# Patient Record
Sex: FEMALE | Race: WHITE | NOT HISPANIC OR LATINO | URBAN - METROPOLITAN AREA
[De-identification: names, ages, dates, MRNs, and addresses within clinical notes are randomized per-mention and may not be internally consistent; named-entity substitution may affect disease eponyms.]

---

## 2023-01-04 ENCOUNTER — INPATIENT (INPATIENT)
Facility: HOSPITAL | Age: 73
LOS: 12 days | Discharge: NURSING, REHAB OR LONG-TERM CARE | End: 2023-01-17
Attending: INTERNAL MEDICINE | Admitting: INTERNAL MEDICINE
Payer: MEDICARE

## 2023-01-04 VITALS
HEIGHT: 60 IN | HEART RATE: 84 BPM | OXYGEN SATURATION: 100 % | SYSTOLIC BLOOD PRESSURE: 115 MMHG | RESPIRATION RATE: 18 BRPM | WEIGHT: 149.03 LBS | DIASTOLIC BLOOD PRESSURE: 71 MMHG | TEMPERATURE: 97 F

## 2023-01-04 DIAGNOSIS — Z96.642 PRESENCE OF LEFT ARTIFICIAL HIP JOINT: Chronic | ICD-10-CM

## 2023-01-04 LAB
ALBUMIN SERPL ELPH-MCNC: 1.9 G/DL — LOW (ref 3.3–5)
ALP SERPL-CCNC: 146 U/L — HIGH (ref 40–120)
ALT FLD-CCNC: 12 U/L — SIGNIFICANT CHANGE UP (ref 12–78)
ANION GAP SERPL CALC-SCNC: 11 MMOL/L — SIGNIFICANT CHANGE UP (ref 5–17)
ANION GAP SERPL CALC-SCNC: 8 MMOL/L — SIGNIFICANT CHANGE UP (ref 5–17)
APTT BLD: 47.5 SEC — HIGH (ref 27.5–35.5)
AST SERPL-CCNC: 12 U/L — LOW (ref 15–37)
BASOPHILS # BLD AUTO: 0.07 K/UL — SIGNIFICANT CHANGE UP (ref 0–0.2)
BASOPHILS NFR BLD AUTO: 0.5 % — SIGNIFICANT CHANGE UP (ref 0–2)
BILIRUB SERPL-MCNC: 0.3 MG/DL — SIGNIFICANT CHANGE UP (ref 0.2–1.2)
BUN SERPL-MCNC: 55 MG/DL — HIGH (ref 7–23)
BUN SERPL-MCNC: 55 MG/DL — HIGH (ref 7–23)
CALCIUM SERPL-MCNC: 10.5 MG/DL — HIGH (ref 8.5–10.1)
CALCIUM SERPL-MCNC: 10.6 MG/DL — HIGH (ref 8.5–10.1)
CHLORIDE SERPL-SCNC: 106 MMOL/L — SIGNIFICANT CHANGE UP (ref 96–108)
CHLORIDE SERPL-SCNC: 107 MMOL/L — SIGNIFICANT CHANGE UP (ref 96–108)
CO2 SERPL-SCNC: 16 MMOL/L — LOW (ref 22–31)
CO2 SERPL-SCNC: 17 MMOL/L — LOW (ref 22–31)
CREAT SERPL-MCNC: 1.12 MG/DL — SIGNIFICANT CHANGE UP (ref 0.5–1.3)
CREAT SERPL-MCNC: 1.2 MG/DL — SIGNIFICANT CHANGE UP (ref 0.5–1.3)
EGFR: 48 ML/MIN/1.73M2 — LOW
EGFR: 52 ML/MIN/1.73M2 — LOW
EOSINOPHIL # BLD AUTO: 0.35 K/UL — SIGNIFICANT CHANGE UP (ref 0–0.5)
EOSINOPHIL NFR BLD AUTO: 2.5 % — SIGNIFICANT CHANGE UP (ref 0–6)
FLUAV AG NPH QL: SIGNIFICANT CHANGE UP
FLUBV AG NPH QL: SIGNIFICANT CHANGE UP
GLUCOSE SERPL-MCNC: 309 MG/DL — HIGH (ref 70–99)
GLUCOSE SERPL-MCNC: 320 MG/DL — HIGH (ref 70–99)
HCT VFR BLD CALC: 30.8 % — LOW (ref 34.5–45)
HGB BLD-MCNC: 9.7 G/DL — LOW (ref 11.5–15.5)
IMM GRANULOCYTES NFR BLD AUTO: 1.3 % — HIGH (ref 0–0.9)
INR BLD: 1.25 RATIO — HIGH (ref 0.88–1.16)
LACTATE SERPL-SCNC: 2 MMOL/L — SIGNIFICANT CHANGE UP (ref 0.7–2)
LYMPHOCYTES # BLD AUTO: 1.66 K/UL — SIGNIFICANT CHANGE UP (ref 1–3.3)
LYMPHOCYTES # BLD AUTO: 11.9 % — LOW (ref 13–44)
MAGNESIUM SERPL-MCNC: 1.7 MG/DL — SIGNIFICANT CHANGE UP (ref 1.6–2.6)
MCHC RBC-ENTMCNC: 26 PG — LOW (ref 27–34)
MCHC RBC-ENTMCNC: 31.5 G/DL — LOW (ref 32–36)
MCV RBC AUTO: 82.6 FL — SIGNIFICANT CHANGE UP (ref 80–100)
MONOCYTES # BLD AUTO: 1.55 K/UL — HIGH (ref 0–0.9)
MONOCYTES NFR BLD AUTO: 11.1 % — SIGNIFICANT CHANGE UP (ref 2–14)
NEUTROPHILS # BLD AUTO: 10.1 K/UL — HIGH (ref 1.8–7.4)
NEUTROPHILS NFR BLD AUTO: 72.7 % — SIGNIFICANT CHANGE UP (ref 43–77)
NRBC # BLD: 0 /100 WBCS — SIGNIFICANT CHANGE UP (ref 0–0)
PLATELET # BLD AUTO: 419 K/UL — HIGH (ref 150–400)
POTASSIUM SERPL-MCNC: 5.2 MMOL/L — SIGNIFICANT CHANGE UP (ref 3.5–5.3)
POTASSIUM SERPL-MCNC: 5.4 MMOL/L — HIGH (ref 3.5–5.3)
POTASSIUM SERPL-SCNC: 5.2 MMOL/L — SIGNIFICANT CHANGE UP (ref 3.5–5.3)
POTASSIUM SERPL-SCNC: 5.4 MMOL/L — HIGH (ref 3.5–5.3)
PROT SERPL-MCNC: 6 GM/DL — SIGNIFICANT CHANGE UP (ref 6–8.3)
PROTHROM AB SERPL-ACNC: 14.9 SEC — HIGH (ref 10.5–13.4)
RBC # BLD: 3.73 M/UL — LOW (ref 3.8–5.2)
RBC # FLD: 15.2 % — HIGH (ref 10.3–14.5)
SARS-COV-2 RNA SPEC QL NAA+PROBE: SIGNIFICANT CHANGE UP
SODIUM SERPL-SCNC: 131 MMOL/L — LOW (ref 135–145)
SODIUM SERPL-SCNC: 134 MMOL/L — LOW (ref 135–145)
TSH SERPL-MCNC: 4.01 UIU/ML — HIGH (ref 0.36–3.74)
WBC # BLD: 13.91 K/UL — HIGH (ref 3.8–10.5)
WBC # FLD AUTO: 13.91 K/UL — HIGH (ref 3.8–10.5)

## 2023-01-04 PROCEDURE — 71045 X-RAY EXAM CHEST 1 VIEW: CPT | Mod: 26

## 2023-01-04 PROCEDURE — 70450 CT HEAD/BRAIN W/O DYE: CPT | Mod: 26,MA

## 2023-01-04 PROCEDURE — 99291 CRITICAL CARE FIRST HOUR: CPT

## 2023-01-04 PROCEDURE — 99222 1ST HOSP IP/OBS MODERATE 55: CPT

## 2023-01-04 PROCEDURE — 93010 ELECTROCARDIOGRAM REPORT: CPT

## 2023-01-04 RX ORDER — SODIUM CHLORIDE 9 MG/ML
1000 INJECTION INTRAMUSCULAR; INTRAVENOUS; SUBCUTANEOUS ONCE
Refills: 0 | Status: COMPLETED | OUTPATIENT
Start: 2023-01-04 | End: 2023-01-05

## 2023-01-04 RX ORDER — SODIUM CHLORIDE 9 MG/ML
1000 INJECTION, SOLUTION INTRAVENOUS ONCE
Refills: 0 | Status: COMPLETED | OUTPATIENT
Start: 2023-01-04 | End: 2023-01-04

## 2023-01-04 RX ADMIN — SODIUM CHLORIDE 1000 MILLILITER(S): 9 INJECTION, SOLUTION INTRAVENOUS at 18:12

## 2023-01-04 NOTE — ED ADULT NURSE REASSESSMENT NOTE - NS ED NURSE REASSESS COMMENT FT1
Patient noted incontinence dermatitis. Moisturizer applied all over the skin on areas of dermatitis. patient noted with left gluteal stage 3 with yellow slough. patient noted with right foot stage I red and blanchable pressure ulcer. patient has unstageable right middle back area. patient cleaned and pressure foam dressing applied.

## 2023-01-04 NOTE — H&P ADULT - PROBLEM SELECTOR PLAN 4
On metformin only, check A1c, LDISS Patient unclear about dosing, restart BP meds as needed Unclear baseline, may be 2/2 CKD  Check iron studies, FOBT

## 2023-01-04 NOTE — H&P ADULT - PROBLEM SELECTOR PLAN 1
Found with feces, cockroaches, has been unable to get up from her couch x months.   Was trying to care for herself with help of neighbor.   - SW consult to f/up if open APS case  - Wound care consult for rashes + likely pressure ulcer on back  - Cousin Munira left 3rd number to contact in case of emergency, Eleazar [phone]  - Porter tran Found with feces, cockroaches, has been unable to get up from her couch x months.   Was trying to care for herself with help of neighbor.   - SW consult to f/up if open APS case  - Wound care consult for rashes + likely pressure ulcer on back  - Cousin Munira left 3rd number to contact in case of emergency,  [place]azar [phone] along with  or . Unclear if patient able to make DNR decision today, will need Munira to fax over HCP and DNR forms tomorrow morning  - Porter tran number [phone]

## 2023-01-04 NOTE — H&P ADULT - HISTORY OF PRESENT ILLNESS
72 F with hx of HTN, non-IDMMT2, presents from home for inability to care for herself via Adult Protective Services.     Has not been able to get off her couch for 6 months, states started to have pain in L hip and since then hasn't been able to get up. Her 77 old neighbor Porter (Boy Li) does iADLs for patient such as providing food for patient. Porter noticed 2 days ago patient was confused so called 911. For unclear reason did not go to the hospital. Porter then was told to call [facility] who evaluated patient today and sent patient to the hospital.     Patient denies fevers, chills, CP, SOB, abdominal pain. Has had her abdominal and back rash for "a while". Patient denies hx of emotional/physical abuse from her neighbor Porter.     Her closest relative cousin Munira is her HCP and calls weekly however has not seen patient since 11/2021. Per Munira and patient a DNR order has been signed.

## 2023-01-04 NOTE — ED PROVIDER NOTE - CLINICAL SUMMARY MEDICAL DECISION MAKING FREE TEXT BOX
failure to thrive. reassess. failure to thrive. admit. failure to thrive. admit.  power of  and health care proxy is Munira Ling at  or  failure to thrive. admit.  power of  and health care proxy is Munira Ling at  or   I read ekg as nsr rate 84, no st elevation or depression, qtc 444, narrow qrs, normal axis.

## 2023-01-04 NOTE — H&P ADULT - NSHPPHYSICALEXAM_GEN_ALL_CORE
T(C): 36.7 (01-04-23 @ 22:30), Max: 36.7 (01-04-23 @ 22:30)  HR: 87 (01-04-23 @ 22:30) (84 - 87)  BP: 109/70 (01-04-23 @ 22:30) (109/70 - 115/71)  RR: 18 (01-04-23 @ 22:30) (18 - 18)  SpO2: 98% (01-04-23 @ 22:30) (98% - 100%)    CONSTITUTIONAL: NAD  EYES:  No conjunctival or scleral injection  HENT:Atraumatic, no external nasal lesions, dry mucous membranes  NECK: No cervical lymphadenopathy appreciated  RESPIRATORY: No respiratory distress, CTA b/l, no wheezes, rales or rhonchi  CARDIOVASCULAR: RRR, +S1S2, no murmurs, no peripheral edema  GASTROINTESTINAL: Soft, non tender, non distended, no rebound, no guarding; No palpable masses palpated  SKIN: Large weeping erythematous patches over L abdomen/groin and L back. Cockroaches in bedpan next to bed. ++ Seborrheic dermatitis diffusely  NEUROLOGIC: Decreased strength/ROM of L hip  PSYCHIATRIC: A+O x 3  EXTREMITIES:  No edema

## 2023-01-04 NOTE — ED PROVIDER NOTE - OBJECTIVE STATEMENT
Call from Adult Protective Services that says that patient is bed bound and incontinence of stool and urine. Not taking of self and cannot. Needs placement. Maite Poole

## 2023-01-04 NOTE — H&P ADULT - ASSESSMENT
72 F with hx of HTN, non-IDMMT2, presents from home for inability to care for herself via Adult Protective Services.

## 2023-01-04 NOTE — ED ADULT NURSE NOTE - NSIMPLEMENTINTERV_GEN_ALL_ED
Implemented All Fall Risk Interventions:  Abie to call system. Call bell, personal items and telephone within reach. Instruct patient to call for assistance. Room bathroom lighting operational. Non-slip footwear when patient is off stretcher. Physically safe environment: no spills, clutter or unnecessary equipment. Stretcher in lowest position, wheels locked, appropriate side rails in place. Provide visual cue, wrist band, yellow gown, etc. Monitor gait and stability. Monitor for mental status changes and reorient to person, place, and time. Review medications for side effects contributing to fall risk. Reinforce activity limits and safety measures with patient and family.

## 2023-01-04 NOTE — ED PROVIDER NOTE - PHYSICAL EXAMINATION
gen - patient is unkempt and disheveled   skin - large territory pressure injury on back  cv - rrr  resp - ctab  neuro - left facial droop, left leg 3/5, other extremities 5/5. sensory intact. following commands. oriented x3  msk - slightly cachectic extremities   eyes - eomi, perrl  id - afebrile gen - patient is unkempt and disheveled   skin - large territory pressure injury on back, also numerous areas of dermatitis on skin   cv - rrr  resp - ctab  neuro - left facial droop, left leg 3/5, other extremities 5/5. sensory intact. following commands. oriented x3  msk - slightly cachectic extremities   eyes - eomi, perrl  id - afebrile

## 2023-01-04 NOTE — ED ADULT TRIAGE NOTE - CHIEF COMPLAINT QUOTE
octavio from home pt's friend/neighbor called 911 due to pt's environment pt unable to care for herself non ambulatory at present urine/feces all over pt's residence and person per ems pt alert and oriented and denies any c/o at present pt visually impaired

## 2023-01-04 NOTE — ED ADULT NURSE NOTE - ED STAT RN HANDOFF DETAILS
Report given to Sunni Bowers RN. Patient in no acute or respiratory distress. patient in stretcher. latest vital signs recorded in flowsheet. endorsed all concerns to RN.

## 2023-01-04 NOTE — H&P ADULT - PROBLEM SELECTOR PLAN 6
On metformin only, check A1c, LDISS    Has elevated TSH to 4, check T4 however within goal for elderly

## 2023-01-04 NOTE — ED ADULT NURSE NOTE - OBJECTIVE STATEMENT
Patient A&Ox3. Patient BIBEMS. as per triage, patient visually impaired. Patient non-ambulatory covered with urine/feces. patient sent to ED because patient can't take of herself. Patient bedbound. patient denies any pain at this time. patient noted with large Stage I ulcer from trunk area to sacral area. patient reports having stroke, with left sided deficit, patient noted with LE and upper extremity left side weakness, residual left side droop. patient able to follow commands. denies any other symptoms at this time.

## 2023-01-04 NOTE — H&P ADULT - PROBLEM SELECTOR PLAN 5
On metformin only, check A1c, LDISS    Has elevated TSH to 4, check T4 however within goal for elderly Patient unclear about dosing, restart BP meds as needed

## 2023-01-04 NOTE — H&P ADULT - PROBLEM SELECTOR PLAN 3
Patient unclear about dosing, restart BP meds as needed May have CKD vs ROGE  Trend in AM after fluids

## 2023-01-04 NOTE — H&P ADULT - PROBLEM SELECTOR PLAN 2
On CT. May be cause of L lower extremity weakness.   Start aspirin, atorvastatin, monitor on tele and check echo  Check L hip x-ray given hx of hip replacement On CT. May be cause of L lower extremity weakness.   Start aspirin, atorvastatin, monitor on tele and check echo  Check L hip x-ray given hx of hip replacement  - PT/OT

## 2023-01-04 NOTE — H&P ADULT - NSHPLABSRESULTS_GEN_ALL_CORE
LABS:                          9.7    13.91 )-----------( 419      ( 04 Jan 2023 17:15 )             30.8     01-04    134<L>  |  107  |  55<H>  ----------------------------<  309<H>  5.2   |  16<L>  |  1.12    Ca    10.6<H>      04 Jan 2023 21:15  Mg     1.7     01-04    TPro  6.0  /  Alb  1.9<L>  /  TBili  0.3  /  DBili  x   /  AST  12<L>  /  ALT  12  /  AlkPhos  146<H>  01-04    PT/INR - ( 04 Jan 2023 17:15 )   PT: 14.9 sec;   INR: 1.25 ratio         PTT - ( 04 Jan 2023 17:15 )  PTT:47.5 sec    Albumin, Serum: 1.9 g/dL (01-04-23 @ 17:15)

## 2023-01-05 DIAGNOSIS — I10 ESSENTIAL (PRIMARY) HYPERTENSION: ICD-10-CM

## 2023-01-05 DIAGNOSIS — R79.89 OTHER SPECIFIED ABNORMAL FINDINGS OF BLOOD CHEMISTRY: ICD-10-CM

## 2023-01-05 DIAGNOSIS — E11.9 TYPE 2 DIABETES MELLITUS WITHOUT COMPLICATIONS: ICD-10-CM

## 2023-01-05 DIAGNOSIS — I63.9 CEREBRAL INFARCTION, UNSPECIFIED: ICD-10-CM

## 2023-01-05 DIAGNOSIS — D64.9 ANEMIA, UNSPECIFIED: ICD-10-CM

## 2023-01-05 DIAGNOSIS — R46.89 OTHER SYMPTOMS AND SIGNS INVOLVING APPEARANCE AND BEHAVIOR: ICD-10-CM

## 2023-01-05 LAB
A1C WITH ESTIMATED AVERAGE GLUCOSE RESULT: 7.7 % — HIGH (ref 4–5.6)
ALBUMIN SERPL ELPH-MCNC: 1.9 G/DL — LOW (ref 3.3–5)
ALP SERPL-CCNC: 146 U/L — HIGH (ref 40–120)
ALT FLD-CCNC: 14 U/L — SIGNIFICANT CHANGE UP (ref 12–78)
AMPHET UR-MCNC: NEGATIVE — SIGNIFICANT CHANGE UP
ANION GAP SERPL CALC-SCNC: 12 MMOL/L — SIGNIFICANT CHANGE UP (ref 5–17)
APPEARANCE UR: CLEAR — SIGNIFICANT CHANGE UP
AST SERPL-CCNC: 16 U/L — SIGNIFICANT CHANGE UP (ref 15–37)
BARBITURATES UR SCN-MCNC: NEGATIVE — SIGNIFICANT CHANGE UP
BENZODIAZ UR-MCNC: NEGATIVE — SIGNIFICANT CHANGE UP
BILIRUB SERPL-MCNC: 0.4 MG/DL — SIGNIFICANT CHANGE UP (ref 0.2–1.2)
BILIRUB UR-MCNC: ABNORMAL
BUN SERPL-MCNC: 50 MG/DL — HIGH (ref 7–23)
CALCIUM SERPL-MCNC: 11.2 MG/DL — HIGH (ref 8.5–10.1)
CHLORIDE SERPL-SCNC: 111 MMOL/L — HIGH (ref 96–108)
CHOLEST SERPL-MCNC: 148 MG/DL — SIGNIFICANT CHANGE UP
CO2 SERPL-SCNC: 14 MMOL/L — LOW (ref 22–31)
COCAINE METAB.OTHER UR-MCNC: NEGATIVE — SIGNIFICANT CHANGE UP
COLOR SPEC: YELLOW — SIGNIFICANT CHANGE UP
CREAT SERPL-MCNC: 1.05 MG/DL — SIGNIFICANT CHANGE UP (ref 0.5–1.3)
DIFF PNL FLD: ABNORMAL
EGFR: 56 ML/MIN/1.73M2 — LOW
ESTIMATED AVERAGE GLUCOSE: 174 MG/DL — HIGH (ref 68–114)
FERRITIN SERPL-MCNC: 589 NG/ML — HIGH (ref 15–150)
GLUCOSE BLDC GLUCOMTR-MCNC: 188 MG/DL — HIGH (ref 70–99)
GLUCOSE BLDC GLUCOMTR-MCNC: 200 MG/DL — HIGH (ref 70–99)
GLUCOSE BLDC GLUCOMTR-MCNC: 255 MG/DL — HIGH (ref 70–99)
GLUCOSE BLDC GLUCOMTR-MCNC: 304 MG/DL — HIGH (ref 70–99)
GLUCOSE SERPL-MCNC: 256 MG/DL — HIGH (ref 70–99)
GLUCOSE UR QL: NEGATIVE MG/DL — SIGNIFICANT CHANGE UP
HCT VFR BLD CALC: 30.1 % — LOW (ref 34.5–45)
HCV AB S/CO SERPL IA: 1.85 S/CO — HIGH (ref 0–0.99)
HCV AB SERPL-IMP: ABNORMAL
HCV RNA FLD QL NAA+PROBE: SIGNIFICANT CHANGE UP
HCV RNA SPEC QL PROBE+SIG AMP: SIGNIFICANT CHANGE UP
HDLC SERPL-MCNC: 43 MG/DL — LOW
HGB BLD-MCNC: 9.5 G/DL — LOW (ref 11.5–15.5)
IRON SATN MFR SERPL: 20 UG/DL — LOW (ref 30–160)
IRON SATN MFR SERPL: 8 % — LOW (ref 14–50)
KETONES UR-MCNC: ABNORMAL
LEUKOCYTE ESTERASE UR-ACNC: ABNORMAL
LIPID PNL WITH DIRECT LDL SERPL: 77 MG/DL — SIGNIFICANT CHANGE UP
MCHC RBC-ENTMCNC: 25.3 PG — LOW (ref 27–34)
MCHC RBC-ENTMCNC: 31.6 G/DL — LOW (ref 32–36)
MCV RBC AUTO: 80.3 FL — SIGNIFICANT CHANGE UP (ref 80–100)
METHADONE UR-MCNC: NEGATIVE — SIGNIFICANT CHANGE UP
NITRITE UR-MCNC: NEGATIVE — SIGNIFICANT CHANGE UP
NON HDL CHOLESTEROL: 105 MG/DL — SIGNIFICANT CHANGE UP
NRBC # BLD: 0 /100 WBCS — SIGNIFICANT CHANGE UP (ref 0–0)
OPIATES UR-MCNC: NEGATIVE — SIGNIFICANT CHANGE UP
PCP SPEC-MCNC: SIGNIFICANT CHANGE UP
PCP UR-MCNC: NEGATIVE — SIGNIFICANT CHANGE UP
PH UR: 5 — SIGNIFICANT CHANGE UP (ref 5–8)
PLATELET # BLD AUTO: 438 K/UL — HIGH (ref 150–400)
POTASSIUM SERPL-MCNC: 5.2 MMOL/L — SIGNIFICANT CHANGE UP (ref 3.5–5.3)
POTASSIUM SERPL-SCNC: 5.2 MMOL/L — SIGNIFICANT CHANGE UP (ref 3.5–5.3)
PROT SERPL-MCNC: 6.1 GM/DL — SIGNIFICANT CHANGE UP (ref 6–8.3)
PROT UR-MCNC: 15 MG/DL
RBC # BLD: 3.75 M/UL — LOW (ref 3.8–5.2)
RBC # FLD: 15.1 % — HIGH (ref 10.3–14.5)
RBC CASTS # UR COMP ASSIST: ABNORMAL /HPF (ref 0–4)
SODIUM SERPL-SCNC: 137 MMOL/L — SIGNIFICANT CHANGE UP (ref 135–145)
SP GR SPEC: 1.02 — SIGNIFICANT CHANGE UP (ref 1.01–1.02)
T3 SERPL-MCNC: 67 NG/DL — LOW (ref 80–200)
T4 AB SER-ACNC: 6.4 UG/DL — SIGNIFICANT CHANGE UP (ref 4.6–12)
THC UR QL: NEGATIVE — SIGNIFICANT CHANGE UP
TIBC SERPL-MCNC: 237 UG/DL — SIGNIFICANT CHANGE UP (ref 220–430)
TRIGL SERPL-MCNC: 141 MG/DL — SIGNIFICANT CHANGE UP
UIBC SERPL-MCNC: 217 UG/DL — SIGNIFICANT CHANGE UP (ref 110–370)
UROBILINOGEN FLD QL: 1 MG/DL
WBC # BLD: 14.98 K/UL — HIGH (ref 3.8–10.5)
WBC # FLD AUTO: 14.98 K/UL — HIGH (ref 3.8–10.5)
WBC UR QL: SIGNIFICANT CHANGE UP

## 2023-01-05 PROCEDURE — 73502 X-RAY EXAM HIP UNI 2-3 VIEWS: CPT | Mod: 26,LT

## 2023-01-05 PROCEDURE — 99233 SBSQ HOSP IP/OBS HIGH 50: CPT

## 2023-01-05 PROCEDURE — 99222 1ST HOSP IP/OBS MODERATE 55: CPT

## 2023-01-05 RX ORDER — ATORVASTATIN CALCIUM 80 MG/1
40 TABLET, FILM COATED ORAL AT BEDTIME
Refills: 0 | Status: DISCONTINUED | OUTPATIENT
Start: 2023-01-05 | End: 2023-01-17

## 2023-01-05 RX ORDER — SODIUM CHLORIDE 9 MG/ML
1000 INJECTION, SOLUTION INTRAVENOUS
Refills: 0 | Status: DISCONTINUED | OUTPATIENT
Start: 2023-01-05 | End: 2023-01-17

## 2023-01-05 RX ORDER — METFORMIN HYDROCHLORIDE 850 MG/1
0 TABLET ORAL
Qty: 0 | Refills: 0 | DISCHARGE

## 2023-01-05 RX ORDER — DEXTROSE 50 % IN WATER 50 %
25 SYRINGE (ML) INTRAVENOUS ONCE
Refills: 0 | Status: DISCONTINUED | OUTPATIENT
Start: 2023-01-05 | End: 2023-01-17

## 2023-01-05 RX ORDER — FUROSEMIDE 40 MG
0 TABLET ORAL
Qty: 0 | Refills: 0 | DISCHARGE

## 2023-01-05 RX ORDER — INSULIN LISPRO 100/ML
VIAL (ML) SUBCUTANEOUS
Refills: 0 | Status: DISCONTINUED | OUTPATIENT
Start: 2023-01-05 | End: 2023-01-17

## 2023-01-05 RX ORDER — ENOXAPARIN SODIUM 100 MG/ML
40 INJECTION SUBCUTANEOUS EVERY 24 HOURS
Refills: 0 | Status: DISCONTINUED | OUTPATIENT
Start: 2023-01-05 | End: 2023-01-07

## 2023-01-05 RX ORDER — DEXTROSE 50 % IN WATER 50 %
12.5 SYRINGE (ML) INTRAVENOUS ONCE
Refills: 0 | Status: DISCONTINUED | OUTPATIENT
Start: 2023-01-05 | End: 2023-01-17

## 2023-01-05 RX ORDER — INSULIN GLARGINE 100 [IU]/ML
12 INJECTION, SOLUTION SUBCUTANEOUS AT BEDTIME
Refills: 0 | Status: DISCONTINUED | OUTPATIENT
Start: 2023-01-05 | End: 2023-01-17

## 2023-01-05 RX ORDER — NYSTATIN CREAM 100000 [USP'U]/G
1 CREAM TOPICAL EVERY 8 HOURS
Refills: 0 | Status: DISCONTINUED | OUTPATIENT
Start: 2023-01-05 | End: 2023-01-07

## 2023-01-05 RX ORDER — GLUCAGON INJECTION, SOLUTION 0.5 MG/.1ML
1 INJECTION, SOLUTION SUBCUTANEOUS ONCE
Refills: 0 | Status: DISCONTINUED | OUTPATIENT
Start: 2023-01-05 | End: 2023-01-17

## 2023-01-05 RX ORDER — ASPIRIN/CALCIUM CARB/MAGNESIUM 324 MG
81 TABLET ORAL DAILY
Refills: 0 | Status: DISCONTINUED | OUTPATIENT
Start: 2023-01-05 | End: 2023-01-17

## 2023-01-05 RX ORDER — PANTOPRAZOLE SODIUM 20 MG/1
40 TABLET, DELAYED RELEASE ORAL
Refills: 0 | Status: DISCONTINUED | OUTPATIENT
Start: 2023-01-05 | End: 2023-01-17

## 2023-01-05 RX ORDER — DEXTROSE 50 % IN WATER 50 %
15 SYRINGE (ML) INTRAVENOUS ONCE
Refills: 0 | Status: DISCONTINUED | OUTPATIENT
Start: 2023-01-05 | End: 2023-01-17

## 2023-01-05 RX ORDER — ACETAMINOPHEN 500 MG
650 TABLET ORAL EVERY 6 HOURS
Refills: 0 | Status: DISCONTINUED | OUTPATIENT
Start: 2023-01-05 | End: 2023-01-17

## 2023-01-05 RX ADMIN — Medication 650 MILLIGRAM(S): at 21:46

## 2023-01-05 RX ADMIN — NYSTATIN CREAM 1 APPLICATION(S): 100000 CREAM TOPICAL at 21:47

## 2023-01-05 RX ADMIN — Medication 1: at 18:35

## 2023-01-05 RX ADMIN — ATORVASTATIN CALCIUM 40 MILLIGRAM(S): 80 TABLET, FILM COATED ORAL at 21:49

## 2023-01-05 RX ADMIN — INSULIN GLARGINE 12 UNIT(S): 100 INJECTION, SOLUTION SUBCUTANEOUS at 21:47

## 2023-01-05 RX ADMIN — SODIUM CHLORIDE 100 MILLILITER(S): 9 INJECTION INTRAMUSCULAR; INTRAVENOUS; SUBCUTANEOUS at 04:36

## 2023-01-05 RX ADMIN — Medication 81 MILLIGRAM(S): at 12:19

## 2023-01-05 RX ADMIN — ENOXAPARIN SODIUM 40 MILLIGRAM(S): 100 INJECTION SUBCUTANEOUS at 12:19

## 2023-01-05 RX ADMIN — Medication 4: at 12:19

## 2023-01-05 RX ADMIN — Medication 650 MILLIGRAM(S): at 22:46

## 2023-01-05 RX ADMIN — PANTOPRAZOLE SODIUM 40 MILLIGRAM(S): 20 TABLET, DELAYED RELEASE ORAL at 09:44

## 2023-01-05 NOTE — CONSULT NOTE ADULT - ASSESSMENT
72 F with hx of HTN, non-IDMMT2, presents from home for inability to care for herself via Adult Protective Services.   disheveled, refused most of exam    leukocytosis  CXR poor quality due ot patient position   CT head ok, no bleed  rash could not be examined due to patient compliance though said can be examined tomorrow     Plan:  follow blood and urine cultures  without antibiotics   continue antifungal as per medicine  consider Dermatology evaluation     Discussed with Dr. Yaya Sequeira, DO  Infectious Disease Attending  Reachable via Microsoft Teams or ID office: [phone]  After 5pm/weekends please call [phone] for all inquiries and new consults

## 2023-01-05 NOTE — PROGRESS NOTE ADULT - SUBJECTIVE AND OBJECTIVE BOX
PROGRESS NOTE:     Patient is a 72y old  Female who presents with a chief complaint of Failure to thrive (2023 23:40)        SUBJECTIVE & OBJECTIVE:   Pt seen and examined at bedside in AM    no overnight events.   complaining of back and buttock pain     REVIEW OF SYSTEMS: remaining ROS negative     PHYSICAL EXAM:      Vitals stable     GENERAL: NAD,   no increased WOB,   HEAD:  Atraumatic, Normocephalic  EYES: EOMI, PERRLA, conjunctiva and sclera clear  ENMT: Moist mucous membranes  NECK: Supple, No JVD  NERVOUS SYSTEM:  Alert & Oriented X3, left sided facial droop and left sided weakness --appear old, speech is slightly slurred but overall coherent , follows commands , no issues with swallowing   CHEST/LUNG: Clear to auscultation bilaterally; No rales, rhonchi, wheezing, or rubs  HEART: Regular rate and rhythm; No murmurs, rubs, or gallops  ABDOMEN: Soft, Nontender, Nondistended; Bowel sounds present  EXTREMITIES:  no edema or calf tenderness b/l   BACK/SKIN: entire back and buttock appear raw and red, very tender to palpation, no open or draining wounds appreciated, but there are some satellite lesions on the buttocks.      MEDICATIONS  (STANDING):  aspirin  chewable 81 milliGRAM(s) Oral daily  atorvastatin 40 milliGRAM(s) Oral at bedtime  dextrose 5%. 1000 milliLiter(s) (100 mL/Hr) IV Continuous <Continuous>  dextrose 5%. 1000 milliLiter(s) (50 mL/Hr) IV Continuous <Continuous>  dextrose 50% Injectable 25 Gram(s) IV Push once  dextrose 50% Injectable 12.5 Gram(s) IV Push once  dextrose 50% Injectable 25 Gram(s) IV Push once  enoxaparin Injectable 40 milliGRAM(s) SubCutaneous every 24 hours  glucagon  Injectable 1 milliGRAM(s) IntraMuscular once  insulin glargine Injectable (LANTUS) 12 Unit(s) SubCutaneous at bedtime  insulin lispro (ADMELOG) corrective regimen sliding scale   SubCutaneous three times a day before meals  nystatin Powder 1 Application(s) Topical every 8 hours  pantoprazole    Tablet 40 milliGRAM(s) Oral before breakfast    MEDICATIONS  (PRN):  acetaminophen     Tablet .. 650 milliGRAM(s) Oral every 6 hours PRN Mild Pain (1 - 3)  dextrose Oral Gel 15 Gram(s) Oral once PRN Blood Glucose LESS THAN 70 milliGRAM(s)/deciliter      LABS:                        9.5    14.98 )-----------( 438      ( 2023 06:05 )             30.1         137  |  111<H>  |  50<H>  ----------------------------<  256<H>  5.2   |  14<L>  |  1.05    Ca    11.2<H>      2023 06:05  Mg     1.7         TPro  6.1  /  Alb  1.9<L>  /  TBili  0.4  /  DBili  x   /  AST  16  /  ALT  14  /  AlkPhos  146<H>      PT/INR - ( 2023 17:15 )   PT: 14.9 sec;   INR: 1.25 ratio         PTT - ( 2023 17:15 )  PTT:47.5 sec  Urinalysis Basic - ( 2023 05:39 )    Color: Yellow / Appearance: Clear / S.025 / pH: x  Gluc: x / Ketone: Trace  / Bili: Small / Urobili: 1 mg/dL   Blood: x / Protein: 15 mg/dL / Nitrite: Negative   Leuk Esterase: Trace / RBC: 3-5 /HPF / WBC 0-2   Sq Epi: x / Non Sq Epi: x / Bacteria: x            RECENT CULTURES:  Urine culture:   @ 05:39 --   No growth  Urine culture:   @ 17:20 --   No growth to date.  Urine culture:   @ 17:15 --   No growth to date.    Clean Catch Clean Catch (Midstream)   @ 05:39   No growth  --  --      .Blood Blood-Peripheral   @ 17:20   No growth to date.  --  --      .Blood Blood-Peripheral   @ 17:15   No growth to date.  --  --            RADIOLOGY & ADDITIONAL TESTS:          Imaging Personally Reviewed:  [ ] YES  [ ] NO    Consultant(s) Notes Reviewed:  [x ] YES  [ ] NO    Care Discussed with Consultants/Other Providers [x ] YES  [ ] NO  Care plan and all findings were discussed in detail with patient.  All questions and concerns addressed

## 2023-01-05 NOTE — ED ADULT NURSE REASSESSMENT NOTE - NS ED NURSE REASSESS COMMENT FT1
0710 Pt received lying comfortable in bed. A&OX4 and denies pain. Admitted to tele; awaiting bed assignment. Pending further orders. INDU Melgoza. RN

## 2023-01-05 NOTE — CONSULT NOTE ADULT - SUBJECTIVE AND OBJECTIVE BOX
[facility]  INFECTIOUS DISEASES   [address] [place] [address]  Tel: [phone]     Fax: [phone]  ======================================================  Karl Zamora,MD Bhavin, DO Barbara Crow, ALISON   ======================================================    JOSE BECKER  MRN-[id number]        Patient is a 72y old  Female who presents with a chief complaint of Failure to thrive (2023 23:52)      HPI:  72 F with hx of HTN, non-IDMMT2, presents from home for inability to care for herself via Adult Protective Services.     Has not been able to get off her couch for 6 months, states started to have pain in L hip and since then hasn't been able to get up. Her 77 old neighbor Porter (Boy Smithandreas) does iADLs for patient such as providing food for patient. Porter noticed 2 days ago patient was confused so called 911. For unclear reason did not go to the hospital. Porter then was told to call [facility] who evaluated patient today and sent patient to the hospital.     Patient denies fevers, chills, CP, SOB, abdominal pain. Has had her abdominal and back rash for "a while". Patient denies hx of emotional/physical abuse from her neighbor Porter.     Her closest relative cousin Munira is her HCP and calls weekly however has not seen patient since 2021. Per Munira and patient a DNR order has been signed.  (2023 23:52)      ID consulted for workup and antibiotic management     PAST MEDICAL & SURGICAL HISTORY:  Hypertension      Diabetes mellitus      S/P hip replacement, left          Allergies  No Known Allergies        ANTIMICROBIALS:      MEDICATIONS  (STANDING):        OTHER MEDS: MEDICATIONS  (STANDING):  acetaminophen     Tablet .. 650 every 6 hours PRN  aspirin  chewable 81 daily  atorvastatin 40 at bedtime  dextrose 50% Injectable 25 once  dextrose 50% Injectable 12.5 once  dextrose 50% Injectable 25 once  dextrose Oral Gel 15 once PRN  enoxaparin Injectable 40 every 24 hours  glucagon  Injectable 1 once  insulin glargine Injectable (LANTUS) 12 at bedtime  insulin lispro (ADMELOG) corrective regimen sliding scale  three times a day before meals  pantoprazole    Tablet 40 before breakfast      SOCIAL HISTORY:     denies smoking etoh and drugs    FAMILY HISTORY:  FH: type 2 diabetes (Father)        REVIEW OF SYSTEMS  [  ] ROS unobtainable because:    [X  ] All other systems negative except as noted below:	    Constitutional:  [ ] fever [ ] chills  [ ] weight loss  [ ] weakness  Skin:  [ ] rash [ ] phlebitis	  Eyes: [ ] icterus [ ] pain  [ ] discharge	  ENMT: [ ] sore throat  [ ] thrush [ ] ulcers [ ] exudates  Respiratory: [ ] dyspnea [ ] hemoptysis [ ] cough [ ] sputum	  Cardiovascular:  [ ] chest pain [ ] palpitations [ ] edema	  Gastrointestinal:  [ ] nausea [ ] vomiting [ ] diarrhea [ ] constipation [ ] pain	  Genitourinary:  [ ] dysuria [ ] frequency [ ] hematuria [ ] discharge [ ] flank pain  [ ] incontinence  Musculoskeletal:  [ ] myalgias [ ] arthralgias [ ] arthritis  [ ] back pain  Neurological:  [ ] headache [ ] seizures  [ ] confusion/altered mental status  Psychiatric:  [ ] anxiety [ ] depression	  Hematology/Lymphatics:  [ ] lymphadenopathy  Endocrine:  [ ] adrenal [ ] thyroid  Allergic/Immunologic:	 [ ] transplant [ ] seasonal    Vital Signs Last 24 Hrs  T(F): 98 (23 @ 20:44), Max: 98.5 (23 @ 15:40)    Vital Signs Last 24 Hrs  HR: 114 (23 @ 20:44) (91 - 114)  BP: 127/79 (23 @ 20:44) (107/72 - 135/76)  RR: 18 (23 @ 20:44)  SpO2: 98% (23 @ 20:44) (96% - 100%)  Wt(kg): --    PHYSICAL EXAM: EXAM VERY LIMITED DUE TO PATIENT REFUSAL   Constitutional: non-toxic, no distress, disheveled, unkempt with facial hair   HEAD/EYES: anicteric, no conjunctival injection  ENT:  poor dentition, unable to visualize rest of oropharynx   Neurologic: awake and alert person, place and time, slow to answer question but answers appropriately   Psychiatric:  awake, alert, appropriate mood          WBC Count: 14.98 K/uL ( @ 06:05)  WBC Count: 13.91 K/uL ( @ 17:15)      Auto Neutrophil %: 72.7 % (23 @ 17:15)  Auto Neutrophil #: 10.10 K/uL *H* (23 @ 17:15)                            9.5    14.98 )-----------( 438      ( 2023 06:05 )             30.1           137  |  111<H>  |  50<H>  ----------------------------<  256<H>  5.2   |  14<L>  |  1.05    Ca    11.2<H>      2023 06:05  Mg     1.7         TPro  6.1  /  Alb  1.9<L>  /  TBili  0.4  /  DBili  x   /  AST  16  /  ALT  14  /  AlkPhos  146<H>        Creatinine Trend: 1.05<--, 1.12<--, 1.20<--    Urinalysis Basic - ( 2023 05:39 )    Color: Yellow / Appearance: Clear / S.025 / pH: x  Gluc: x / Ketone: Trace  / Bili: Small / Urobili: 1 mg/dL   Blood: x / Protein: 15 mg/dL / Nitrite: Negative   Leuk Esterase: Trace / RBC: 3-5 /HPF / WBC 0-2   Sq Epi: x / Non Sq Epi: x / Bacteria: x        Lactate, Blood: 2.0 mmol/L (23 @ 17:15)      MICROBIOLOGY:    Ferritin, Serum: 589 ()    SARS-CoV-2 Result: NotDetec (23 @ 17:15)    RADIOLOGY:  < from: CT Head No Cont (23 @ 17:42) >  IMPRESSION:    No acute intracranial bleeding.  Volume loss, chronic microvascular ischemic changes, and chronic lacunar   infarctions.    < from: Xray Chest 1 View-PORTABLE IMMEDIATE (23 @ 17:13) >    IMPRESSION:  1. While the patient is rotated, the visualized lungs are clear with no   acute cardiopulmonary abnormality seen.  2. Degenerative changes of the thoracic spine.    (I personally reviewed)

## 2023-01-05 NOTE — PROGRESS NOTE ADULT - ASSESSMENT
72 F with hx of HTN, non-IDMMT2, presents from home for inability to care for herself via Adult Protective Services.     possibly fungal infection /pressure injury of back d/t prolonged sitting in her feces/urine   --wound care appreciated   --nystatin powder to affected areas   --ID consulted      Problem/Plan - 1:  ·  Problem: Self neglect.   ·  Plan: Found with feces, cockroaches, has been unable to get up from her couch x months.   Was trying to care for herself with help of neighbor.   - SW consult to f/up if open APS case  - Wound care consult for rashes + likely pressure ulcer on back  - Cousin Munira left 3rd number to contact in case of emergency,  Eleazar [phone] along with  or . Unclear if patient able to make DNR decision today, will need Munira to fax over HCP and DNR forms tomorrow morning  - Porter tran number [phone].     Problem/Plan - 2:  ·  Problem: CVA (cerebrovascular accident).   ·  Plan: On CT. May be cause of L lower extremity weakness.   Start aspirin, atorvastatin, monitor on tele and check echo  Check L hip x-ray given hx of hip replacement  - PT/OT.     Problem/Plan - 3:  ·  Problem: Elevated serum creatinine.   ·  Plan: May have CKD vs ROGE  Trend in AM after fluids.     Problem/Plan - 4:  ·  Problem: Anemia.  ·  Plan: Unclear baseline, may be 2/2 CKD  Check iron studies, FOBT.     Problem/Plan - 5:  ·  Problem: Hypertension.  ·  Plan: Patient unclear about dosing, restart BP meds as needed.     Problem/Plan - 6:  ·  Problem: Diabetes mellitus.   ·  Plan: On metformin only, check A1c, LDISS    Has elevated TSH to 4, check T4 however within goal for elderly.

## 2023-01-05 NOTE — ED ADULT NURSE REASSESSMENT NOTE - NS ED NURSE REASSESS COMMENT FT1
Report given to receiving RN GILBERT Kelley ,pts history, current condition and reason for admission discussed, safety concerns addressed and reviewed, pt currently in stable condition, IV flushes for patency and site shows no signs or symptoms of infiltrate, dressing is clean dry and intact, pt is aware of plan of care.  ED, transported 13:23

## 2023-01-06 LAB
A1C WITH ESTIMATED AVERAGE GLUCOSE RESULT: 7.6 % — HIGH (ref 4–5.6)
ANION GAP SERPL CALC-SCNC: 10 MMOL/L — SIGNIFICANT CHANGE UP (ref 5–17)
BUN SERPL-MCNC: 40 MG/DL — HIGH (ref 7–23)
CALCIUM SERPL-MCNC: 10.9 MG/DL — HIGH (ref 8.5–10.1)
CHLORIDE SERPL-SCNC: 112 MMOL/L — HIGH (ref 96–108)
CO2 SERPL-SCNC: 17 MMOL/L — LOW (ref 22–31)
CREAT SERPL-MCNC: 0.99 MG/DL — SIGNIFICANT CHANGE UP (ref 0.5–1.3)
CULTURE RESULTS: NO GROWTH — SIGNIFICANT CHANGE UP
EGFR: 60 ML/MIN/1.73M2 — SIGNIFICANT CHANGE UP
ESTIMATED AVERAGE GLUCOSE: 171 MG/DL — HIGH (ref 68–114)
GLUCOSE BLDC GLUCOMTR-MCNC: 156 MG/DL — HIGH (ref 70–99)
GLUCOSE BLDC GLUCOMTR-MCNC: 172 MG/DL — HIGH (ref 70–99)
GLUCOSE BLDC GLUCOMTR-MCNC: 207 MG/DL — HIGH (ref 70–99)
GLUCOSE BLDC GLUCOMTR-MCNC: 211 MG/DL — HIGH (ref 70–99)
GLUCOSE SERPL-MCNC: 195 MG/DL — HIGH (ref 70–99)
HCT VFR BLD CALC: 29.7 % — LOW (ref 34.5–45)
HGB BLD-MCNC: 9.3 G/DL — LOW (ref 11.5–15.5)
MCHC RBC-ENTMCNC: 25.5 PG — LOW (ref 27–34)
MCHC RBC-ENTMCNC: 31.3 G/DL — LOW (ref 32–36)
MCV RBC AUTO: 81.6 FL — SIGNIFICANT CHANGE UP (ref 80–100)
NRBC # BLD: 0 /100 WBCS — SIGNIFICANT CHANGE UP (ref 0–0)
PLATELET # BLD AUTO: 475 K/UL — HIGH (ref 150–400)
POTASSIUM SERPL-MCNC: 4.9 MMOL/L — SIGNIFICANT CHANGE UP (ref 3.5–5.3)
POTASSIUM SERPL-SCNC: 4.9 MMOL/L — SIGNIFICANT CHANGE UP (ref 3.5–5.3)
RBC # BLD: 3.64 M/UL — LOW (ref 3.8–5.2)
RBC # FLD: 15.2 % — HIGH (ref 10.3–14.5)
SODIUM SERPL-SCNC: 139 MMOL/L — SIGNIFICANT CHANGE UP (ref 135–145)
SPECIMEN SOURCE: SIGNIFICANT CHANGE UP
WBC # BLD: 13.82 K/UL — HIGH (ref 3.8–10.5)
WBC # FLD AUTO: 13.82 K/UL — HIGH (ref 3.8–10.5)

## 2023-01-06 PROCEDURE — 93010 ELECTROCARDIOGRAM REPORT: CPT

## 2023-01-06 PROCEDURE — 99233 SBSQ HOSP IP/OBS HIGH 50: CPT

## 2023-01-06 PROCEDURE — 99232 SBSQ HOSP IP/OBS MODERATE 35: CPT

## 2023-01-06 RX ORDER — SODIUM CHLORIDE 9 MG/ML
1000 INJECTION, SOLUTION INTRAVENOUS
Refills: 0 | Status: DISCONTINUED | OUTPATIENT
Start: 2023-01-06 | End: 2023-01-16

## 2023-01-06 RX ORDER — OXYCODONE AND ACETAMINOPHEN 5; 325 MG/1; MG/1
1 TABLET ORAL EVERY 6 HOURS
Refills: 0 | Status: DISCONTINUED | OUTPATIENT
Start: 2023-01-06 | End: 2023-01-06

## 2023-01-06 RX ORDER — MORPHINE SULFATE 50 MG/1
2 CAPSULE, EXTENDED RELEASE ORAL EVERY 4 HOURS
Refills: 0 | Status: DISCONTINUED | OUTPATIENT
Start: 2023-01-06 | End: 2023-01-12

## 2023-01-06 RX ADMIN — INSULIN GLARGINE 12 UNIT(S): 100 INJECTION, SOLUTION SUBCUTANEOUS at 22:01

## 2023-01-06 RX ADMIN — NYSTATIN CREAM 1 APPLICATION(S): 100000 CREAM TOPICAL at 22:02

## 2023-01-06 RX ADMIN — ENOXAPARIN SODIUM 40 MILLIGRAM(S): 100 INJECTION SUBCUTANEOUS at 11:35

## 2023-01-06 RX ADMIN — Medication 2: at 17:13

## 2023-01-06 RX ADMIN — PANTOPRAZOLE SODIUM 40 MILLIGRAM(S): 20 TABLET, DELAYED RELEASE ORAL at 05:29

## 2023-01-06 RX ADMIN — Medication 2: at 08:37

## 2023-01-06 RX ADMIN — SODIUM CHLORIDE 75 MILLILITER(S): 9 INJECTION, SOLUTION INTRAVENOUS at 11:42

## 2023-01-06 RX ADMIN — SODIUM CHLORIDE 75 MILLILITER(S): 9 INJECTION, SOLUTION INTRAVENOUS at 22:01

## 2023-01-06 RX ADMIN — ATORVASTATIN CALCIUM 40 MILLIGRAM(S): 80 TABLET, FILM COATED ORAL at 22:02

## 2023-01-06 RX ADMIN — NYSTATIN CREAM 1 APPLICATION(S): 100000 CREAM TOPICAL at 05:28

## 2023-01-06 RX ADMIN — NYSTATIN CREAM 1 APPLICATION(S): 100000 CREAM TOPICAL at 17:14

## 2023-01-06 RX ADMIN — Medication 81 MILLIGRAM(S): at 11:34

## 2023-01-06 RX ADMIN — Medication 2: at 12:47

## 2023-01-06 NOTE — PROGRESS NOTE ADULT - ASSESSMENT
72 F with hx of HTN, non-IDMMT2, presents from home for inability to care for herself via Adult Protective Services.     possibly fungal infection /pressure injury of back d/t prolonged sitting in her feces/urine   --wound care appreciated   --nystatin powder to affected areas   --ID following      Problem/Plan - 1:  ·  Problem: Self neglect.   ·  Plan: Found with feces, cockroaches, has been unable to get up from her couch x months.   Was trying to care for herself with help of neighbor.   - SW consult to f/up if open APS case  - Wound care consult for rashes + likely pressure ulcer on back  - Cousin Munira left 3rd number to contact in case of emergency,  Eleazar [phone] along with  or . Unclear if patient able to make DNR decision today, will need Munira to fax over HCP and DNR forms tomorrow morning  - Porter tran number [phone].     Problem/Plan - 2:  ·  Problem: CVA (cerebrovascular accident).   ·  Plan: On CT. May be cause of L lower extremity weakness.   Start aspirin, atorvastatin, monitor on tele and check echo  Check L hip x-ray given hx of hip replacement  - PT/OT.     Problem/Plan - 3:  ·  Problem: Elevated serum creatinine.   ·  Plan: May have CKD vs ROGE  Trend in AM after fluids.     Problem/Plan - 4:  ·  Problem: Anemia.  ·  Plan: Unclear baseline, may be 2/2 CKD  Check iron studies, FOBT.     Problem/Plan - 5:  ·  Problem: Hypertension.  ·  Plan: Patient unclear about dosing, restart BP meds as needed.     Problem/Plan - 6:  ·  Problem: Diabetes mellitus.   ·  Plan: On metformin only, check A1c, LDISS    Has elevated TSH to 4, check T4 however within goal for elderly.

## 2023-01-06 NOTE — PROGRESS NOTE ADULT - SUBJECTIVE AND OBJECTIVE BOX
JOSE BECKER  MRN-[id number]    Follow Up:  skin changes    Interval History: the pt was seen and examined earlier, no distress, anxious, pt's distant family is present. As per family members present, the pt has been distancing herself from the rest of the family for years. The pt is afebrile, tachycardic, on RA, with mild leukocytosis. The pt complains of pain with movement due friction between her skin and bedsheets.      PAST MEDICAL & SURGICAL HISTORY:  Hypertension      Diabetes mellitus      S/P hip replacement, left          ROS:    [ ] Unobtainable because:  [x ] All other systems negative    Constitutional: no fever, no chills  Head: no trauma  Eyes: no vision changes, no eye pain  ENT:  no sore throat, no rhinorrhea  Cardiovascular:  no chest pain, no palpitation  Respiratory:  no SOB, no cough  GI:  no abd pain, no vomiting, no diarrhea  urinary: no dysuria, no hematuria, no flank pain  musculoskeletal:  unclear if the pt has left knee pain with movement, no joint swelling  skin:  skin excoriation and pain related to it  neurology:  no headache, no seizure, no change in mental status  psych: no anxiety, no depression         Allergies  No Known Allergies        ANTIMICROBIALS:      OTHER MEDS:  acetaminophen     Tablet .. 650 milliGRAM(s) Oral every 6 hours PRN  aspirin  chewable 81 milliGRAM(s) Oral daily  atorvastatin 40 milliGRAM(s) Oral at bedtime  dextrose 5%. 1000 milliLiter(s) IV Continuous <Continuous>  dextrose 5%. 1000 milliLiter(s) IV Continuous <Continuous>  dextrose 50% Injectable 25 Gram(s) IV Push once  dextrose 50% Injectable 12.5 Gram(s) IV Push once  dextrose 50% Injectable 25 Gram(s) IV Push once  dextrose Oral Gel 15 Gram(s) Oral once PRN  enoxaparin Injectable 40 milliGRAM(s) SubCutaneous every 24 hours  glucagon  Injectable 1 milliGRAM(s) IntraMuscular once  insulin glargine Injectable (LANTUS) 12 Unit(s) SubCutaneous at bedtime  insulin lispro (ADMELOG) corrective regimen sliding scale   SubCutaneous three times a day before meals  lactated ringers. 1000 milliLiter(s) IV Continuous <Continuous>  morphine  - Injectable 2 milliGRAM(s) IV Push every 4 hours PRN  nystatin Powder 1 Application(s) Topical every 8 hours  oxycodone    5 mG/acetaminophen 325 mG 1 Tablet(s) Oral every 6 hours PRN  pantoprazole    Tablet 40 milliGRAM(s) Oral before breakfast      Vital Signs Last 24 Hrs  T(C): 37 (2023 17:00), Max: 37 (2023 00:00)  T(F): 98.6 (2023 17:00), Max: 98.6 (2023 00:00)  HR: 107 (2023 17:00) (10 - 114)  BP: 104/67 (2023 17:00) (104/67 - 127/79)  BP(mean): --  RR: 17 (2023 17:00) (17 - 18)  SpO2: 95% (2023 17:00) (95% - 98%)    Parameters below as of 2023 05:00  Patient On (Oxygen Delivery Method): room air        Physical Exam:  General:    NAD,  non toxic, disheveled  Head: atraumatic, normocephalic, + Hirsutism  Eye: normal sclera and conjunctiva, anicteric, no conjunctival injection  ENT:    no oral lesions, missing teeth, poor dentition overall, neck supple  Cardio:    tachycardic S1, S2,  no murmur  Respiratory:    clear b/l,    no wheezing, no rhonchi, no rales   abd:     soft,   BS +,   no tenderness, obese, no guarding, no palpable mass   :   no CVAT,  no suprapubic tenderness,   no  morrow  Musculoskeletal:   no joint swelling,   no edema, developing contractures b/l LEs   vascular: no central lines, +PIV   Skin:  severe skin excoriation of left lower abdomen, left thigh and flank, large portion of back and sacral area, the area does not seem to be infected but raw looking and causes a significant amount of pain with movement and friction   Neurologic:  awake and alert, answers questions, follows commands  psych: somewhat anxious       WBC Count: 13.82 K/uL ( @ 08:20)  WBC Count: 14.98 K/uL ( @ 06:05)  WBC Count: 13.91 K/uL ( @ 17:15)                            9.3    13.82 )-----------( 475      ( 2023 08:20 )             29.7           139  |  112<H>  |  40<H>  ----------------------------<  195<H>  4.9   |  17<L>  |  0.99    Ca    10.9<H>      2023 08:20    TPro  6.1  /  Alb  1.9<L>  /  TBili  0.4  /  DBili  x   /  AST  16  /  ALT  14  /  AlkPhos  146<H>        Urinalysis Basic - ( 2023 05:39 )    Color: Yellow / Appearance: Clear / S.025 / pH: x  Gluc: x / Ketone: Trace  / Bili: Small / Urobili: 1 mg/dL   Blood: x / Protein: 15 mg/dL / Nitrite: Negative   Leuk Esterase: Trace / RBC: 3-5 /HPF / WBC 0-2   Sq Epi: x / Non Sq Epi: x / Bacteria: x        Creatinine Trend: 0.99<--, 1.05<--, 1.12<--, 1.20<--      MICROBIOLOGY:  v  Clean Catch Clean Catch (Midstream)  23   No growth  --  --      .Blood Blood-Peripheral  23   No growth to date.  --  --      .Blood Blood-Peripheral  23   No growth to date.  --  --    Ferritin, Serum: 589 ()    SARS-CoV-2 Result: Angélicate (23 @ 17:15)    RADIOLOGY:

## 2023-01-06 NOTE — PATIENT PROFILE ADULT - FALL HARM RISK - HARM RISK INTERVENTIONS

## 2023-01-06 NOTE — PROGRESS NOTE ADULT - ASSESSMENT
72 F with hx of HTN, non-IDMMT2, presents from home for inability to care for herself via Adult Protective Services.   disheveled, refused most of exam    leukocytosis  CXR poor quality due ot patient position   CT head ok, no bleed  rash could not be examined due to patient compliance though said can be examined tomorrow     1/6: no fevers, tachycardic, RA, WBC better 13.82, Cr ok, BCs and UC with no growth to date. Due to pt's immobility, the pt has developed severe skin excoriation, raw looking skin - large portion of pt's back, left side of pt's abdomen, flank and part of her thigh, painful, not infected looking. The pt will need excellent skin care and timely turning and position. Please take into consideration that when the pt is ready for discharge, the pt will not be able to care for herself.     Plan:  follow blood and urine cultures - NGTD  monitor off abx  continue antifungal as per medicine  consider Dermatology evaluation   excellent skin care  social work

## 2023-01-06 NOTE — PATIENT PROFILE ADULT - HOME ACCESSIBILITY CONCERNS
Patient given tobacco quitline number [phone] at this time, refusing to call at this time, states \" I just dont want to quit now\"- patient given information as to the dangers of long term tobacco use. Continue to reinforce the importance of tobacco cessation. none

## 2023-01-06 NOTE — PROGRESS NOTE ADULT - NS ATTEND AMEND GEN_ALL_CORE FT
agree with above low suspicion for infection at this time  needs excellent skin care   pain control   soacial work  involvement    Bhavin Sequeira, DO  Infectious Disease Attending  Reachable via Microsoft Teams or ID office: [phone]  After 5pm/weekends please call [phone] for all inquiries and new consults

## 2023-01-06 NOTE — PROGRESS NOTE ADULT - SUBJECTIVE AND OBJECTIVE BOX
PROGRESS NOTE:     Patient is a 72y old  Female who presents with a chief complaint of Failure to thrive (2023 23:40)        SUBJECTIVE & OBJECTIVE:   Pt seen and examined at bedside in AM    no overnight events.   complaining of back and buttock pain     REVIEW OF SYSTEMS: remaining ROS negative     PHYSICAL EXAM:  Vital Signs Last 24 Hrs  T(C): 37 (2023 17:00), Max: 37 (2023 00:00)  T(F): 98.6 (2023 17:00), Max: 98.6 (2023 00:00)  HR: 107 (2023 17:00) (10 - 114)  BP: 104/67 (2023 17:00) (104/67 - 127/79)  BP(mean): --  RR: 17 (2023 17:00) (17 - 18)  SpO2: 95% (2023 17:00) (95% - 98%)    Parameters below as of 2023 05:00  Patient On (Oxygen Delivery Method): room air         GENERAL: NAD,   no increased WOB,   HEAD:  Atraumatic, Normocephalic  EYES: EOMI, PERRLA, conjunctiva and sclera clear  ENMT: Moist mucous membranes  NECK: Supple, No JVD  NERVOUS SYSTEM:  Alert & Oriented X3, left sided facial droop and left sided weakness --appear old, speech is slightly slurred but overall coherent , follows commands , no issues with swallowing   CHEST/LUNG: Clear to auscultation bilaterally; No rales, rhonchi, wheezing, or rubs  HEART: Regular rate and rhythm; No murmurs, rubs, or gallops  ABDOMEN: Soft, Nontender, Nondistended; Bowel sounds present  EXTREMITIES:  no edema or calf tenderness b/l   BACK/SKIN: entire back and buttock appear raw and red, very tender to palpation, no open or draining wounds appreciated, but there are some satellite lesions on the buttocks.      MEDICATIONS  (STANDING):  aspirin  chewable 81 milliGRAM(s) Oral daily  atorvastatin 40 milliGRAM(s) Oral at bedtime  dextrose 5%. 1000 milliLiter(s) (100 mL/Hr) IV Continuous <Continuous>  dextrose 5%. 1000 milliLiter(s) (50 mL/Hr) IV Continuous <Continuous>  dextrose 50% Injectable 25 Gram(s) IV Push once  dextrose 50% Injectable 12.5 Gram(s) IV Push once  dextrose 50% Injectable 25 Gram(s) IV Push once  enoxaparin Injectable 40 milliGRAM(s) SubCutaneous every 24 hours  glucagon  Injectable 1 milliGRAM(s) IntraMuscular once  insulin glargine Injectable (LANTUS) 12 Unit(s) SubCutaneous at bedtime  insulin lispro (ADMELOG) corrective regimen sliding scale   SubCutaneous three times a day before meals  nystatin Powder 1 Application(s) Topical every 8 hours  pantoprazole    Tablet 40 milliGRAM(s) Oral before breakfast    MEDICATIONS  (PRN):  acetaminophen     Tablet .. 650 milliGRAM(s) Oral every 6 hours PRN Mild Pain (1 - 3)  dextrose Oral Gel 15 Gram(s) Oral once PRN Blood Glucose LESS THAN 70 milliGRAM(s)/deciliter      LABS:                                   9.3    13.82 )-----------( 475      ( 2023 08:20 )             29.7       139  |  112<H>  |  40<H>  ----------------------------<  195<H>  4.9   |  17<L>  |  0.99    Ca    10.9<H>      2023 08:20      Urinalysis Basic - ( 2023 05:39 )    Color: Yellow / Appearance: Clear / S.025 / pH: x  Gluc: x / Ketone: Trace  / Bili: Small / Urobili: 1 mg/dL   Blood: x / Protein: 15 mg/dL / Nitrite: Negative   Leuk Esterase: Trace / RBC: 3-5 /HPF / WBC 0-2   Sq Epi: x / Non Sq Epi: x / Bacteria: x            RECENT CULTURES:  Urine culture:   @ 05:39 --   No growth  Urine culture:   @ 17:20 --   No growth to date.  Urine culture:   @ 17:15 --   No growth to date.    Clean Catch Clean Catch (Midstream)   @ 05:39   No growth  --  --      .Blood Blood-Peripheral   @ 17:20   No growth to date.  --  --      .Blood Blood-Peripheral   @ 17:15   No growth to date.  --  --            RADIOLOGY & ADDITIONAL TESTS:          Imaging Personally Reviewed:  [ ] YES  [ ] NO    Consultant(s) Notes Reviewed:  [x ] YES  [ ] NO    Care Discussed with Consultants/Other Providers [x ] YES  [ ] NO  Care plan and all findings were discussed in detail with patient.  All questions and concerns addressed

## 2023-01-07 LAB
BUN SERPL-MCNC: 27 MG/DL — HIGH (ref 7–23)
CALCIUM SERPL-MCNC: 10.8 MG/DL — HIGH (ref 8.5–10.1)
CHLORIDE SERPL-SCNC: 109 MMOL/L — SIGNIFICANT CHANGE UP (ref 96–108)
CO2 SERPL-SCNC: 22 MMOL/L — SIGNIFICANT CHANGE UP (ref 22–31)
CREAT SERPL-MCNC: 0.88 MG/DL — SIGNIFICANT CHANGE UP (ref 0.5–1.3)
D DIMER BLD IA.RAPID-MCNC: 828 NG/ML DDU — HIGH
EGFR: 69 ML/MIN/1.73M2 — SIGNIFICANT CHANGE UP
FOLATE SERPL-MCNC: 4.8 NG/ML — SIGNIFICANT CHANGE UP
GLUCOSE BLDC GLUCOMTR-MCNC: 149 MG/DL — HIGH (ref 70–99)
GLUCOSE BLDC GLUCOMTR-MCNC: 156 MG/DL — HIGH (ref 70–99)
GLUCOSE BLDC GLUCOMTR-MCNC: 166 MG/DL — HIGH (ref 70–99)
GLUCOSE BLDC GLUCOMTR-MCNC: 209 MG/DL — HIGH (ref 70–99)
GLUCOSE SERPL-MCNC: 179 MG/DL — HIGH (ref 70–99)
HCT VFR BLD CALC: 31 % — LOW (ref 34.5–45)
HGB BLD-MCNC: 9.7 G/DL — LOW (ref 11.5–15.5)
MAGNESIUM SERPL-MCNC: 1.6 MG/DL — SIGNIFICANT CHANGE UP (ref 1.6–2.6)
MCHC RBC-ENTMCNC: 25.3 PG — LOW (ref 27–34)
MCHC RBC-ENTMCNC: 31.3 G/DL — LOW (ref 32–36)
MCV RBC AUTO: 80.9 FL — SIGNIFICANT CHANGE UP (ref 80–100)
NRBC # BLD: 0 /100 WBCS — SIGNIFICANT CHANGE UP (ref 0–0)
PHOSPHATE SERPL-MCNC: 2.5 MG/DL — SIGNIFICANT CHANGE UP (ref 2.5–4.5)
PLATELET # BLD AUTO: 452 K/UL — HIGH (ref 150–400)
POTASSIUM SERPL-MCNC: 4.4 MMOL/L — SIGNIFICANT CHANGE UP (ref 3.5–5.3)
POTASSIUM SERPL-SCNC: 4.4 MMOL/L — SIGNIFICANT CHANGE UP (ref 3.5–5.3)
RBC # BLD: 3.83 M/UL — SIGNIFICANT CHANGE UP (ref 3.8–5.2)
RBC # FLD: 15.3 % — HIGH (ref 10.3–14.5)
SODIUM SERPL-SCNC: 141 MMOL/L — SIGNIFICANT CHANGE UP (ref 135–145)
VIT B12 SERPL-MCNC: 599 PG/ML — SIGNIFICANT CHANGE UP (ref 232–1245)
WBC # BLD: 11.64 K/UL — HIGH (ref 3.8–10.5)
WBC # FLD AUTO: 11.64 K/UL — HIGH (ref 3.8–10.5)

## 2023-01-07 PROCEDURE — 99497 ADVNCD CARE PLAN 30 MIN: CPT

## 2023-01-07 PROCEDURE — 99232 SBSQ HOSP IP/OBS MODERATE 35: CPT

## 2023-01-07 PROCEDURE — 93970 EXTREMITY STUDY: CPT | Mod: 26

## 2023-01-07 RX ORDER — PREGABALIN 225 MG/1
1000 CAPSULE ORAL DAILY
Refills: 0 | Status: DISCONTINUED | OUTPATIENT
Start: 2023-01-07 | End: 2023-01-17

## 2023-01-07 RX ORDER — FOLIC ACID 0.8 MG
1 TABLET ORAL DAILY
Refills: 0 | Status: DISCONTINUED | OUTPATIENT
Start: 2023-01-07 | End: 2023-01-17

## 2023-01-07 RX ORDER — NYSTATIN CREAM 100000 [USP'U]/G
1 CREAM TOPICAL
Refills: 0 | Status: DISCONTINUED | OUTPATIENT
Start: 2023-01-07 | End: 2023-01-17

## 2023-01-07 RX ORDER — APIXABAN 2.5 MG/1
10 TABLET, FILM COATED ORAL EVERY 12 HOURS
Refills: 0 | Status: COMPLETED | OUTPATIENT
Start: 2023-01-07 | End: 2023-01-14

## 2023-01-07 RX ADMIN — Medication 81 MILLIGRAM(S): at 11:59

## 2023-01-07 RX ADMIN — SODIUM CHLORIDE 75 MILLILITER(S): 9 INJECTION, SOLUTION INTRAVENOUS at 17:04

## 2023-01-07 RX ADMIN — Medication 1 MILLIGRAM(S): at 17:07

## 2023-01-07 RX ADMIN — MORPHINE SULFATE 2 MILLIGRAM(S): 50 CAPSULE, EXTENDED RELEASE ORAL at 17:03

## 2023-01-07 RX ADMIN — PANTOPRAZOLE SODIUM 40 MILLIGRAM(S): 20 TABLET, DELAYED RELEASE ORAL at 08:37

## 2023-01-07 RX ADMIN — MORPHINE SULFATE 2 MILLIGRAM(S): 50 CAPSULE, EXTENDED RELEASE ORAL at 22:50

## 2023-01-07 RX ADMIN — ATORVASTATIN CALCIUM 40 MILLIGRAM(S): 80 TABLET, FILM COATED ORAL at 22:09

## 2023-01-07 RX ADMIN — ENOXAPARIN SODIUM 40 MILLIGRAM(S): 100 INJECTION SUBCUTANEOUS at 11:59

## 2023-01-07 RX ADMIN — Medication 1: at 08:37

## 2023-01-07 RX ADMIN — NYSTATIN CREAM 1 APPLICATION(S): 100000 CREAM TOPICAL at 22:09

## 2023-01-07 RX ADMIN — NYSTATIN CREAM 1 APPLICATION(S): 100000 CREAM TOPICAL at 06:34

## 2023-01-07 RX ADMIN — INSULIN GLARGINE 12 UNIT(S): 100 INJECTION, SOLUTION SUBCUTANEOUS at 22:19

## 2023-01-07 RX ADMIN — APIXABAN 10 MILLIGRAM(S): 2.5 TABLET, FILM COATED ORAL at 23:35

## 2023-01-07 RX ADMIN — MORPHINE SULFATE 2 MILLIGRAM(S): 50 CAPSULE, EXTENDED RELEASE ORAL at 22:20

## 2023-01-07 RX ADMIN — NYSTATIN CREAM 1 APPLICATION(S): 100000 CREAM TOPICAL at 16:56

## 2023-01-07 RX ADMIN — SODIUM CHLORIDE 75 MILLILITER(S): 9 INJECTION, SOLUTION INTRAVENOUS at 06:33

## 2023-01-07 RX ADMIN — MORPHINE SULFATE 2 MILLIGRAM(S): 50 CAPSULE, EXTENDED RELEASE ORAL at 18:00

## 2023-01-07 RX ADMIN — PREGABALIN 1000 MICROGRAM(S): 225 CAPSULE ORAL at 17:04

## 2023-01-07 RX ADMIN — Medication 2: at 12:00

## 2023-01-07 NOTE — PROGRESS NOTE ADULT - SUBJECTIVE AND OBJECTIVE BOX
PROGRESS NOTE:     Patient is a 72y old  Female who presents with a chief complaint of Failure to thrive (2023 23:40)        SUBJECTIVE & OBJECTIVE:   Pt seen and examined at bedside in AM    no overnight events.   no new complaints     REVIEW OF SYSTEMS: remaining ROS negative     PHYSICAL EXAM:  Vital Signs Last 24 Hrs  T(C): 37.1 (2023 17:03), Max: 37.5 (2023 11:04)  T(F): 98.8 (2023 17:03), Max: 99.5 (2023 11:04)  HR: 114 (2023 17:03) (107 - 115)  BP: 161/85 (2023 17:03) (134/82 - 161/85)  BP(mean): --  RR: 18 (2023 17:03) (17 - 19)  SpO2: 99% (2023 17:03) (96% - 99%)    Parameters below as of 2023 05:09  Patient On (Oxygen Delivery Method): room air        GENERAL: NAD,   no increased WOB,   HEAD:  Atraumatic, Normocephalic  EYES: EOMI, PERRLA, conjunctiva and sclera clear  ENMT: Moist mucous membranes  NECK: Supple, No JVD  NERVOUS SYSTEM:  Alert & Oriented X 2-3, intermittent confusion, left sided facial droop and left sided weakness --appear old, speech is slightly slurred but overall coherent , follows commands , no issues with swallowing   CHEST/LUNG: Clear to auscultation bilaterally; No rales, rhonchi, wheezing, or rubs  HEART: Regular rate and rhythm; No murmurs, rubs, or gallops  ABDOMEN: Soft, Nontender, Nondistended; Bowel sounds present  EXTREMITIES:  no edema or calf tenderness b/l   BACK/SKIN: entire back and buttock appear raw and red, very tender to palpation, no open or draining wounds appreciated, but there are some satellite lesions on the buttocks.      MEDICATIONS  (STANDING):  aspirin  chewable 81 milliGRAM(s) Oral daily  atorvastatin 40 milliGRAM(s) Oral at bedtime  dextrose 5%. 1000 milliLiter(s) (100 mL/Hr) IV Continuous <Continuous>  dextrose 5%. 1000 milliLiter(s) (50 mL/Hr) IV Continuous <Continuous>  dextrose 50% Injectable 25 Gram(s) IV Push once  dextrose 50% Injectable 12.5 Gram(s) IV Push once  dextrose 50% Injectable 25 Gram(s) IV Push once  enoxaparin Injectable 40 milliGRAM(s) SubCutaneous every 24 hours  glucagon  Injectable 1 milliGRAM(s) IntraMuscular once  insulin glargine Injectable (LANTUS) 12 Unit(s) SubCutaneous at bedtime  insulin lispro (ADMELOG) corrective regimen sliding scale   SubCutaneous three times a day before meals  nystatin Powder 1 Application(s) Topical every 8 hours  pantoprazole    Tablet 40 milliGRAM(s) Oral before breakfast    MEDICATIONS  (PRN):  acetaminophen     Tablet .. 650 milliGRAM(s) Oral every 6 hours PRN Mild Pain (1 - 3)  dextrose Oral Gel 15 Gram(s) Oral once PRN Blood Glucose LESS THAN 70 milliGRAM(s)/deciliter      LABS:                                              9.7    11.64 )-----------( 452      ( 2023 07:40 )             31.0       141  |  109  |  27<H>  ----------------------------<  179<H>  4.4   |  22  |  0.88    Ca    10.8<H>      2023 06:31  Phos  2.5       Mg     1.6               Urinalysis Basic - ( 2023 05:39 )    Color: Yellow / Appearance: Clear / S.025 / pH: x  Gluc: x / Ketone: Trace  / Bili: Small / Urobili: 1 mg/dL   Blood: x / Protein: 15 mg/dL / Nitrite: Negative   Leuk Esterase: Trace / RBC: 3-5 /HPF / WBC 0-2   Sq Epi: x / Non Sq Epi: x / Bacteria: x            RECENT CULTURES:  Urine culture:   @ 05:39 --   No growth  Urine culture:   @ 17:20 --   No growth to date.  Urine culture:   @ 17:15 --   No growth to date.    Clean Catch Clean Catch (Midstream)   @ 05:39   No growth  --  --      .Blood Blood-Peripheral   @ 17:20   No growth to date.  --  --      .Blood Blood-Peripheral   @ 17:15   No growth to date.  --  --            RADIOLOGY & ADDITIONAL TESTS:    < from: Xray Chest 1 View-PORTABLE IMMEDIATE (23 @ 17:13) >  ACC: [id number] EXAM:  XR CHEST PORTABLE IMMED 1V                          PROCEDURE DATE:  2023          INTERPRETATION:  INDICATION: Sepsis    COMPARISON: None    FINDINGS:  An AP portable supine view of the chest shows the patient rotated tothe   left however the visualized lungs are clear with no infiltrates seen on   either side. There is no pneumothorax. There is no pleural effusion. The   hilar, mediastinal structures and heart size cannot be assessed   accurately due to positioning. There is no CHF. There are degenerative   changes of the spine.    IMPRESSION:  1. While the patient is rotated, the visualized lungs are clear with no   acute cardiopulmonary abnormality seen.  2. Degenerative changes of the thoracic spine.    < end of copied text >    < from: Xray Hip 2-3 Views, Left (23 @ 11:04) >  PROCEDURE DATE:  2023          INTERPRETATION:  clinical history: Pain    Left hip 2 views    No fracture or dislocation. No focal osseous lesion. Left total hip   replacement is seen    IMPRESSION:   No fracture.    < end of copied text >        Imaging Personally Reviewed:  [ ] YES  [ ] NO    Consultant(s) Notes Reviewed:  [x ] YES  [ ] NO    Care Discussed with Consultants/Other Providers [x ] YES  [ ] NO  Care plan and all findings were discussed in detail with patient and cousin (Power of ) Munira at bedside.  All questions and concerns addressed

## 2023-01-07 NOTE — PROGRESS NOTE ADULT - ASSESSMENT
72 F with hx of HTN, non-IDMMT2, presents from home for inability to care for herself via Adult Protective Services.     1/7/23 elevated d-dimer,  LE duplex showed Left soleal vein deep vein thrombosis. Acute deep venous thrombosis: below the knee.   eliquis was started     possibly fungal infection /pressure injury of back d/t prolonged sitting in her feces/urine   --wound care appreciated   --nystatin ointment to affected areas (nystatin powder does not stick to affected wounds)  --if able , will attempt dermatology eval although difficult in this hospital as dermatology specialty is not available. This is not a derm urgency or emergency and will continue to monitor progress   --ID following      Problem/Plan - 1:  ·  Problem: Self neglect.   ·  Plan: Found with feces, cockroaches, has been unable to get up from her couch x months.   Was trying to care for herself with help of neighbor.   - SW consult to f/up if open APS case  - Wound care consult for rashes + likely pressure ulcer on back  - Cousin Munira left 3rd number to contact in case of emergency,  [place] [phone] along with  or . Unclear if patient able to make DNR decision today, will need Munira to fax over HCP and DNR forms tomorrow morning  - [facility] tran number [phone].     Problem/Plan - 2:  ·  Problem: CVA (cerebrovascular accident).   ·  Plan: On CT. May be cause of L lower extremity weakness.   Start aspirin, atorvastatin, monitor on tele and check echo  Check L hip x-ray given hx of hip replacement  - PT/OT.     Problem/Plan - 3:  ·  Problem: Elevated serum creatinine.   ·  Plan: May have CKD vs ROGE  Trend in AM after fluids.     Problem/Plan - 4:  ·  Problem: Anemia.  ·  Plan: Unclear baseline, may be 2/2 CKD  Check iron studies, FOBT.     Problem/Plan - 5:  ·  Problem: Hypertension.  ·  Plan: Patient unclear about dosing, restart BP meds as needed.     Problem/Plan - 6:  ·  Problem: Diabetes mellitus.   A1c 7.6%   ·  Plan: On metformin only,  LDISS    Has elevated TSH to 4, check T4 however within goal for elderly.    DNR/DNI   fall, aspiration precautions, HOBE

## 2023-01-07 NOTE — PROGRESS NOTE ADULT - CONVERSATION DETAILS
diagnosis, prognosis and MOLST were discussed with Munira today. She brought in documents naming her and friend as power of attorneys also living will stating no aggressive interventions including NO CPR, NO ARTIFICAL MECHANICAL VENTILATION AND NO FEEDING TUBES (copies were made and placed in chart).  Munira understand that patient will need SNF placement and is agreeable.   She stated that she respects patients wishes and a MOLST was signed and placed in chart.

## 2023-01-08 LAB
24R-OH-CALCIDIOL SERPL-MCNC: 7.5 NG/ML — LOW (ref 30–80)
GLUCOSE BLDC GLUCOMTR-MCNC: 137 MG/DL — HIGH (ref 70–99)
GLUCOSE BLDC GLUCOMTR-MCNC: 145 MG/DL — HIGH (ref 70–99)
GLUCOSE BLDC GLUCOMTR-MCNC: 161 MG/DL — HIGH (ref 70–99)
GLUCOSE BLDC GLUCOMTR-MCNC: 234 MG/DL — HIGH (ref 70–99)
HCT VFR BLD CALC: 28.7 % — LOW (ref 34.5–45)
HGB BLD-MCNC: 8.9 G/DL — LOW (ref 11.5–15.5)
MCHC RBC-ENTMCNC: 25.5 PG — LOW (ref 27–34)
MCHC RBC-ENTMCNC: 31 G/DL — LOW (ref 32–36)
MCV RBC AUTO: 82.2 FL — SIGNIFICANT CHANGE UP (ref 80–100)
NRBC # BLD: 0 /100 WBCS — SIGNIFICANT CHANGE UP (ref 0–0)
PLATELET # BLD AUTO: 379 K/UL — SIGNIFICANT CHANGE UP (ref 150–400)
PROT SERPL-MCNC: 4.8 G/DL — LOW (ref 6–8.3)
PROT SERPL-MCNC: 4.8 G/DL — LOW (ref 6–8.3)
RBC # BLD: 3.49 M/UL — LOW (ref 3.8–5.2)
RBC # FLD: 15.3 % — HIGH (ref 10.3–14.5)
VIT B12 SERPL-MCNC: 978 PG/ML — SIGNIFICANT CHANGE UP (ref 232–1245)
WBC # BLD: 12.87 K/UL — HIGH (ref 3.8–10.5)
WBC # FLD AUTO: 12.87 K/UL — HIGH (ref 3.8–10.5)

## 2023-01-08 PROCEDURE — 99232 SBSQ HOSP IP/OBS MODERATE 35: CPT

## 2023-01-08 RX ORDER — CHOLECALCIFEROL (VITAMIN D3) 125 MCG
2000 CAPSULE ORAL DAILY
Refills: 0 | Status: DISCONTINUED | OUTPATIENT
Start: 2023-01-08 | End: 2023-01-17

## 2023-01-08 RX ORDER — QUETIAPINE FUMARATE 200 MG/1
12.5 TABLET, FILM COATED ORAL AT BEDTIME
Refills: 0 | Status: DISCONTINUED | OUTPATIENT
Start: 2023-01-08 | End: 2023-01-17

## 2023-01-08 RX ORDER — ALPRAZOLAM 0.25 MG
0.25 TABLET ORAL DAILY
Refills: 0 | Status: DISCONTINUED | OUTPATIENT
Start: 2023-01-08 | End: 2023-01-09

## 2023-01-08 RX ORDER — ERGOCALCIFEROL 1.25 MG/1
50000 CAPSULE ORAL
Refills: 0 | Status: DISCONTINUED | OUTPATIENT
Start: 2023-01-08 | End: 2023-01-08

## 2023-01-08 RX ORDER — MAGNESIUM HYDROXIDE 400 MG/1
30 TABLET, CHEWABLE ORAL DAILY
Refills: 0 | Status: DISCONTINUED | OUTPATIENT
Start: 2023-01-08 | End: 2023-01-17

## 2023-01-08 RX ORDER — CEFAZOLIN SODIUM 1 G
VIAL (EA) INJECTION
Refills: 0 | Status: COMPLETED | OUTPATIENT
Start: 2023-01-08 | End: 2023-01-13

## 2023-01-08 RX ORDER — SENNA PLUS 8.6 MG/1
2 TABLET ORAL AT BEDTIME
Refills: 0 | Status: DISCONTINUED | OUTPATIENT
Start: 2023-01-08 | End: 2023-01-17

## 2023-01-08 RX ORDER — CEFAZOLIN SODIUM 1 G
1000 VIAL (EA) INJECTION EVERY 8 HOURS
Refills: 0 | Status: COMPLETED | OUTPATIENT
Start: 2023-01-08 | End: 2023-01-13

## 2023-01-08 RX ORDER — CEFAZOLIN SODIUM 1 G
1000 VIAL (EA) INJECTION ONCE
Refills: 0 | Status: COMPLETED | OUTPATIENT
Start: 2023-01-08 | End: 2023-01-08

## 2023-01-08 RX ADMIN — MORPHINE SULFATE 2 MILLIGRAM(S): 50 CAPSULE, EXTENDED RELEASE ORAL at 15:00

## 2023-01-08 RX ADMIN — Medication 1 MILLIGRAM(S): at 12:42

## 2023-01-08 RX ADMIN — APIXABAN 10 MILLIGRAM(S): 2.5 TABLET, FILM COATED ORAL at 06:40

## 2023-01-08 RX ADMIN — SODIUM CHLORIDE 75 MILLILITER(S): 9 INJECTION, SOLUTION INTRAVENOUS at 06:40

## 2023-01-08 RX ADMIN — MORPHINE SULFATE 2 MILLIGRAM(S): 50 CAPSULE, EXTENDED RELEASE ORAL at 14:21

## 2023-01-08 RX ADMIN — Medication 100 MILLIGRAM(S): at 14:09

## 2023-01-08 RX ADMIN — Medication 2000 UNIT(S): at 18:53

## 2023-01-08 RX ADMIN — SODIUM CHLORIDE 75 MILLILITER(S): 9 INJECTION, SOLUTION INTRAVENOUS at 07:59

## 2023-01-08 RX ADMIN — PREGABALIN 1000 MICROGRAM(S): 225 CAPSULE ORAL at 12:43

## 2023-01-08 RX ADMIN — Medication 81 MILLIGRAM(S): at 12:42

## 2023-01-08 RX ADMIN — Medication 1: at 08:00

## 2023-01-08 RX ADMIN — QUETIAPINE FUMARATE 12.5 MILLIGRAM(S): 200 TABLET, FILM COATED ORAL at 22:01

## 2023-01-08 RX ADMIN — PANTOPRAZOLE SODIUM 40 MILLIGRAM(S): 20 TABLET, DELAYED RELEASE ORAL at 06:41

## 2023-01-08 RX ADMIN — SODIUM CHLORIDE 75 MILLILITER(S): 9 INJECTION, SOLUTION INTRAVENOUS at 18:46

## 2023-01-08 RX ADMIN — Medication 100 MILLIGRAM(S): at 22:01

## 2023-01-08 RX ADMIN — NYSTATIN CREAM 1 APPLICATION(S): 100000 CREAM TOPICAL at 06:41

## 2023-01-08 RX ADMIN — INSULIN GLARGINE 12 UNIT(S): 100 INJECTION, SOLUTION SUBCUTANEOUS at 22:00

## 2023-01-08 RX ADMIN — APIXABAN 10 MILLIGRAM(S): 2.5 TABLET, FILM COATED ORAL at 18:53

## 2023-01-08 RX ADMIN — ATORVASTATIN CALCIUM 40 MILLIGRAM(S): 80 TABLET, FILM COATED ORAL at 22:01

## 2023-01-08 RX ADMIN — SENNA PLUS 2 TABLET(S): 8.6 TABLET ORAL at 22:01

## 2023-01-08 RX ADMIN — NYSTATIN CREAM 1 APPLICATION(S): 100000 CREAM TOPICAL at 18:43

## 2023-01-08 NOTE — PROGRESS NOTE ADULT - ASSESSMENT
72 F with hx of HTN, non-IDMMT2, presents from home for inability to care for herself via Adult Protective Services.     1/7/23 elevated d-dimer,  LE duplex showed Left soleal vein deep vein thrombosis. Acute deep venous thrombosis: below the knee.   eliquis was started     possibly fungal infection /pressure injury of back d/t prolonged sitting in her feces/urine   --wound care appreciated   --nystatin ointment to affected areas (nystatin powder does not stick to affected wounds)  --if able , will attempt dermatology eval although difficult in this hospital as dermatology specialty is not available. This is not a derm urgency or emergency and will continue to monitor progress   --ID following      Problem/Plan - 1:  ·  Problem: Self neglect.   ·  Plan: Found with feces, cockroaches, has been unable to get up from her couch x months.   Was trying to care for herself with help of neighbor.   - SW consult to f/up if open APS case  - Wound care consult for rashes + likely pressure ulcer on back  - Cousin Munira left 3rd number to contact in case of emergency,  Eleazar [phone] along with  or . Unclear if patient able to make DNR decision today, will need Munira to fax over HCP and DNR forms tomorrow morning  - [facility] tran number [phone].     Problem/Plan - 2:  ·  Problem: CVA (cerebrovascular accident).   ·  Plan: On CT. May be cause of L lower extremity weakness.   Start aspirin, atorvastatin, monitor on tele and check echo  Check L hip x-ray given hx of hip replacement  - PT/OT.     Problem/Plan - 3:  ·  Problem: Elevated serum creatinine.   ·  Plan: May have CKD vs ROGE  Trend in AM after fluids.     Problem/Plan - 4:  ·  Problem: Anemia.  ·  Plan: Unclear baseline, may be 2/2 CKD  Check iron studies, FOBT.     Problem/Plan - 5:  ·  Problem: Hypertension.  ·  Plan: Patient unclear about dosing, restart BP meds as needed.     Problem/Plan - 6:  ·  Problem: Diabetes mellitus.   A1c 7.6%   ·  Plan: On metformin only,  LDISS    Has elevated TSH to 4, check T4 however within goal for elderly.    DNR/DNI   fall, aspiration precautions, HOBE    72 F with hx of HTN, non-IDMMT2, presents from home for inability to care for herself via Adult Protective Services.     1/7/23 elevated d-dimer,  LE duplex showed Left soleal vein deep vein thrombosis. Acute deep venous thrombosis: below the knee.   eliquis was started     possibly fungal infection /pressure injury of back d/t prolonged sitting in her feces/urine   --wound care appreciated   --nystatin ointment to affected areas (nystatin powder does not stick to affected wounds)  --if able , will attempt dermatology eval although difficult in this hospital as dermatology specialty is not available. This is not a derm urgency or emergency and will continue to monitor progress   --ID following   1/8/23 added ancef to cover for superimposed cellulitis of her back wounds       Problem/Plan - 1:  ·  Problem: Self neglect.   ·  Plan: Found with feces, cockroaches, has been unable to get up from her couch x months.   Was trying to care for herself with help of neighbor.   - SW consult to f/up if open APS case  - Wound care consult for rashes + likely pressure ulcer on back  - Cousin Munira left 3rd number to contact in case of emergency,  [place] [phone] along with  or . Unclear if patient able to make DNR decision today, will need Munira to fax over HCP and DNR forms tomorrow morning  - [facility] tran number [phone].     Problem/Plan - 2:  ·  Problem: CVA (cerebrovascular accident).   ·  Plan: On CT. May be cause of L lower extremity weakness.   Start aspirin, atorvastatin, monitor on tele and check echo  Check L hip x-ray given hx of hip replacement  - PT/OT.     Problem/Plan - 3:  ·  Problem: Elevated serum creatinine.   ·  Plan: May have CKD vs ROGE  Trend in AM after fluids.     Problem/Plan - 4:  ·  Problem: Anemia.  ·  Plan: Unclear baseline, may be 2/2 CKD  Check iron studies, FOBT.     Problem/Plan - 5:  ·  Problem: Hypertension.  ·  Plan: Patient unclear about dosing, restart BP meds as needed.     Problem/Plan - 6:  ·  Problem: Diabetes mellitus.   A1c 7.6%   ·  Plan: On metformin only,  LDISS    Has elevated TSH to 4, check T4 however within goal for elderly.    DNR/DNI   fall, aspiration precautions, HOBE    72 F with hx of HTN, non-IDMMT2, presents from home for inability to care for herself via Adult Protective Services.     1/7/23 elevated d-dimer,  LE duplex showed Left soleal vein deep vein thrombosis. Acute deep venous thrombosis: below the knee.   eliquis was started   1/8/23 tachycardia most likely d/t agitation and pain ; refusing meds and care at times, most likely some component of delirium and pain     possibly fungal infection /pressure injury of back d/t prolonged sitting in her feces/urine   --wound care appreciated   --nystatin ointment to affected areas (nystatin powder does not stick to affected wounds)  --if able , will attempt dermatology eval although difficult in this hospital as dermatology specialty is not available. This is not a derm urgency or emergency and will continue to monitor progress   --ID following   1/8/23 added ancef to cover for superimposed cellulitis of her back wounds  discussed with RN, please leave affected area to air dry after nystatin ointment        Problem/Plan - 1:  ·  Problem: Self neglect.   ·  Plan: Found with feces, cockroaches, has been unable to get up from her couch x months.   Was trying to care for herself with help of neighbor.   - SW consult to f/up if open APS case  - Wound care consult for rashes + likely pressure ulcer on back  - Cousin Munira left 3rd number to contact in case of emergency,  [place] [phone] along with  or . Unclear if patient able to make DNR decision today, will need Munira to fax over HCP and DNR forms tomorrow morning  - [facility] chelsea number [phone].     Problem/Plan - 2:  ·  Problem: CVA (cerebrovascular accident).   ·  Plan: On CT. May be cause of L lower extremity weakness.   Start aspirin, atorvastatin, monitor on tele and check echo  Check L hip x-ray given hx of hip replacement  - PT/OT.     Problem/Plan - 3:  ·  Problem: Elevated serum creatinine.   ·  Plan: May have CKD vs ROGE  Trend in AM after fluids.     Problem/Plan - 4:  ·  Problem: Anemia.  ·  Plan: Unclear baseline, may be 2/2 CKD  Check iron studies, FOBT.     Problem/Plan - 5:  ·  Problem: Hypertension.  ·  Plan: Patient unclear about dosing, restart BP meds as needed.     Problem/Plan - 6:  ·  Problem: Diabetes mellitus.   A1c 7.6%   ·  Plan: On metformin only,  LDISS    Has elevated TSH to 4, check T4 however within goal for elderly.    hypercalcemia --follow UPEP/SPEP    Vit D deficiency --supplement     DNR/DNI   fall, aspiration precautions, HOBE

## 2023-01-08 NOTE — PROGRESS NOTE ADULT - SUBJECTIVE AND OBJECTIVE BOX
PROGRESS NOTE:     Patient is a 72y old  Female who presents with a chief complaint of Failure to thrive (2023 23:40)        SUBJECTIVE & OBJECTIVE:   Pt seen and examined at bedside in AM    no overnight events.   no new complaints     REVIEW OF SYSTEMS: remaining ROS negative     PHYSICAL EXAM:  Vital Signs Last 24 Hrs  T(C): 36.9 (2023 11:58), Max: 37.2 (2023 23:15)  T(F): 98.4 (2023 11:58), Max: 98.9 (2023 23:15)  HR: 94 (2023 12:51) (94 - 109)  BP: 126/67 (2023 11:58) (112/68 - 150/83)  BP(mean): --  RR: 17 (2023 12:51) (17 - 18)  SpO2: 99% (2023 12:51) (96% - 99%)    Parameters below as of 2023 12:51  Patient On (Oxygen Delivery Method): room air      GENERAL: NAD,   no increased WOB,   HEAD:  Atraumatic, Normocephalic  EYES: EOMI, PERRLA, conjunctiva and sclera clear  ENMT: Moist mucous membranes  NECK: Supple, No JVD  NERVOUS SYSTEM:  Alert & Oriented X 2-3, intermittent confusion, left sided facial droop and left sided weakness --appear old, speech is slightly slurred but overall coherent , follows commands , no issues with swallowing   CHEST/LUNG: Clear to auscultation bilaterally; No rales, rhonchi, wheezing, or rubs  HEART: Regular rate and rhythm; No murmurs, rubs, or gallops  ABDOMEN: Soft, Nontender, Nondistended; Bowel sounds present  EXTREMITIES:  no edema or calf tenderness b/l   BACK/SKIN: entire back and buttock appear raw and red, very tender to palpation, no open or draining wounds appreciated, but there are some satellite lesions on the buttocks.      MEDICATIONS  (STANDING):  aspirin  chewable 81 milliGRAM(s) Oral daily  atorvastatin 40 milliGRAM(s) Oral at bedtime  dextrose 5%. 1000 milliLiter(s) (100 mL/Hr) IV Continuous <Continuous>  dextrose 5%. 1000 milliLiter(s) (50 mL/Hr) IV Continuous <Continuous>  dextrose 50% Injectable 25 Gram(s) IV Push once  dextrose 50% Injectable 12.5 Gram(s) IV Push once  dextrose 50% Injectable 25 Gram(s) IV Push once  enoxaparin Injectable 40 milliGRAM(s) SubCutaneous every 24 hours  glucagon  Injectable 1 milliGRAM(s) IntraMuscular once  insulin glargine Injectable (LANTUS) 12 Unit(s) SubCutaneous at bedtime  insulin lispro (ADMELOG) corrective regimen sliding scale   SubCutaneous three times a day before meals  nystatin Powder 1 Application(s) Topical every 8 hours  pantoprazole    Tablet 40 milliGRAM(s) Oral before breakfast    MEDICATIONS  (PRN):  acetaminophen     Tablet .. 650 milliGRAM(s) Oral every 6 hours PRN Mild Pain (1 - 3)  dextrose Oral Gel 15 Gram(s) Oral once PRN Blood Glucose LESS THAN 70 milliGRAM(s)/deciliter      LABS:                                                         8.9    12.87 )-----------( 379      ( 2023 08:00 )             28.7       141  |  109  |  27<H>  ----------------------------<  179<H>  4.4   |  22  |  0.88    Ca    10.8<H>      2023 06:31  Phos  2.5       Mg     1.6         TPro  4.8<L>  /  Alb  x   /  TBili  x   /  DBili  x   /  AST  x   /  ALT  x   /  AlkPhos  x           Urinalysis Basic - ( 2023 05:39 )    Color: Yellow / Appearance: Clear / S.025 / pH: x  Gluc: x / Ketone: Trace  / Bili: Small / Urobili: 1 mg/dL   Blood: x / Protein: 15 mg/dL / Nitrite: Negative   Leuk Esterase: Trace / RBC: 3-5 /HPF / WBC 0-2   Sq Epi: x / Non Sq Epi: x / Bacteria: x            RECENT CULTURES:  Urine culture:   @ 05:39 --   No growth  Urine culture:   @ 17:20 --   No growth to date.  Urine culture:   @ 17:15 --   No growth to date.    Clean Catch Clean Catch (Midstream)   @ 05:39   No growth  --  --      .Blood Blood-Peripheral   @ 17:20   No growth to date.  --  --      .Blood Blood-Peripheral   @ 17:15   No growth to date.  --  --            RADIOLOGY & ADDITIONAL TESTS:    < from: Xray Chest 1 View-PORTABLE IMMEDIATE (23 @ 17:13) >  ACC: [id number] EXAM:  XR CHEST PORTABLE IMMED 1V                          PROCEDURE DATE:  2023          INTERPRETATION:  INDICATION: Sepsis    COMPARISON: None    FINDINGS:  An AP portable supine view of the chest shows the patient rotated tothe   left however the visualized lungs are clear with no infiltrates seen on   either side. There is no pneumothorax. There is no pleural effusion. The   hilar, mediastinal structures and heart size cannot be assessed   accurately due to positioning. There is no CHF. There are degenerative   changes of the spine.    IMPRESSION:  1. While the patient is rotated, the visualized lungs are clear with no   acute cardiopulmonary abnormality seen.  2. Degenerative changes of the thoracic spine.    < end of copied text >    < from: Xray Hip 2-3 Views, Left (23 @ 11:04) >  PROCEDURE DATE:  2023          INTERPRETATION:  clinical history: Pain    Left hip 2 views    No fracture or dislocation. No focal osseous lesion. Left total hip   replacement is seen    IMPRESSION:   No fracture.    < end of copied text >        Imaging Personally Reviewed:  [ ] YES  [ ] NO    Consultant(s) Notes Reviewed:  [x ] YES  [ ] NO    Care Discussed with Consultants/Other Providers [x ] YES  [ ] NO  Care plan and all findings were discussed in detail with patient.  All questions and concerns addressed   Hatchet Flap Text: The defect edges were debeveled with a #15 scalpel blade.  Given the location of the defect, shape of the defect and the proximity to free margins a hatchet flap was deemed most appropriate.  Using a sterile surgical marker, an appropriate hatchet flap was drawn incorporating the defect and placing the expected incisions within the relaxed skin tension lines where possible.    The area thus outlined was incised deep to adipose tissue with a #15 scalpel blade.  The skin margins were undermined to an appropriate distance in all directions utilizing iris scissors.

## 2023-01-09 LAB
ANION GAP SERPL CALC-SCNC: 8 MMOL/L — SIGNIFICANT CHANGE UP (ref 5–17)
BUN SERPL-MCNC: 13 MG/DL — SIGNIFICANT CHANGE UP (ref 7–23)
CA-I BLD-SCNC: 5.8 MG/DL — HIGH (ref 4.5–5.6)
CALCIUM SERPL-MCNC: 9.7 MG/DL — SIGNIFICANT CHANGE UP (ref 8.5–10.1)
CHLORIDE SERPL-SCNC: 108 MMOL/L — SIGNIFICANT CHANGE UP (ref 96–108)
CO2 SERPL-SCNC: 22 MMOL/L — SIGNIFICANT CHANGE UP (ref 22–31)
CREAT SERPL-MCNC: 0.77 MG/DL — SIGNIFICANT CHANGE UP (ref 0.5–1.3)
CREATININE, URINE RESULT: 132 MG/DL — SIGNIFICANT CHANGE UP
EGFR: 81 ML/MIN/1.73M2 — SIGNIFICANT CHANGE UP
GLUCOSE BLDC GLUCOMTR-MCNC: 164 MG/DL — HIGH (ref 70–99)
GLUCOSE BLDC GLUCOMTR-MCNC: 169 MG/DL — HIGH (ref 70–99)
GLUCOSE BLDC GLUCOMTR-MCNC: 229 MG/DL — HIGH (ref 70–99)
GLUCOSE BLDC GLUCOMTR-MCNC: 260 MG/DL — HIGH (ref 70–99)
GLUCOSE SERPL-MCNC: 151 MG/DL — HIGH (ref 70–99)
HCT VFR BLD CALC: 29.6 % — LOW (ref 34.5–45)
HGB BLD-MCNC: 9.1 G/DL — LOW (ref 11.5–15.5)
MCHC RBC-ENTMCNC: 25.6 PG — LOW (ref 27–34)
MCHC RBC-ENTMCNC: 30.7 G/DL — LOW (ref 32–36)
MCV RBC AUTO: 83.1 FL — SIGNIFICANT CHANGE UP (ref 80–100)
NRBC # BLD: 0 /100 WBCS — SIGNIFICANT CHANGE UP (ref 0–0)
PLATELET # BLD AUTO: 326 K/UL — SIGNIFICANT CHANGE UP (ref 150–400)
POTASSIUM SERPL-MCNC: 4 MMOL/L — SIGNIFICANT CHANGE UP (ref 3.5–5.3)
POTASSIUM SERPL-SCNC: 4 MMOL/L — SIGNIFICANT CHANGE UP (ref 3.5–5.3)
PROT ?TM UR-MCNC: 42 MG/DL — HIGH (ref 0–12)
PROT ?TM UR-MCNC: 42 MG/DL — HIGH (ref 0–12)
RBC # BLD: 3.56 M/UL — LOW (ref 3.8–5.2)
RBC # FLD: 15.2 % — HIGH (ref 10.3–14.5)
SODIUM SERPL-SCNC: 138 MMOL/L — SIGNIFICANT CHANGE UP (ref 135–145)
WBC # BLD: 11.14 K/UL — HIGH (ref 3.8–10.5)
WBC # FLD AUTO: 11.14 K/UL — HIGH (ref 3.8–10.5)

## 2023-01-09 PROCEDURE — 99232 SBSQ HOSP IP/OBS MODERATE 35: CPT

## 2023-01-09 PROCEDURE — 73562 X-RAY EXAM OF KNEE 3: CPT | Mod: 26,50

## 2023-01-09 RX ADMIN — MORPHINE SULFATE 2 MILLIGRAM(S): 50 CAPSULE, EXTENDED RELEASE ORAL at 14:35

## 2023-01-09 RX ADMIN — SODIUM CHLORIDE 75 MILLILITER(S): 9 INJECTION, SOLUTION INTRAVENOUS at 08:03

## 2023-01-09 RX ADMIN — APIXABAN 10 MILLIGRAM(S): 2.5 TABLET, FILM COATED ORAL at 17:24

## 2023-01-09 RX ADMIN — Medication 3: at 12:05

## 2023-01-09 RX ADMIN — MORPHINE SULFATE 2 MILLIGRAM(S): 50 CAPSULE, EXTENDED RELEASE ORAL at 13:35

## 2023-01-09 RX ADMIN — INSULIN GLARGINE 12 UNIT(S): 100 INJECTION, SOLUTION SUBCUTANEOUS at 21:53

## 2023-01-09 RX ADMIN — Medication 1: at 16:39

## 2023-01-09 RX ADMIN — Medication 100 MILLIGRAM(S): at 21:52

## 2023-01-09 RX ADMIN — MAGNESIUM HYDROXIDE 30 MILLILITER(S): 400 TABLET, CHEWABLE ORAL at 12:22

## 2023-01-09 RX ADMIN — Medication 1: at 08:02

## 2023-01-09 RX ADMIN — SENNA PLUS 2 TABLET(S): 8.6 TABLET ORAL at 21:52

## 2023-01-09 RX ADMIN — PANTOPRAZOLE SODIUM 40 MILLIGRAM(S): 20 TABLET, DELAYED RELEASE ORAL at 06:57

## 2023-01-09 RX ADMIN — NYSTATIN CREAM 1 APPLICATION(S): 100000 CREAM TOPICAL at 17:24

## 2023-01-09 RX ADMIN — APIXABAN 10 MILLIGRAM(S): 2.5 TABLET, FILM COATED ORAL at 06:57

## 2023-01-09 RX ADMIN — Medication 2000 UNIT(S): at 12:13

## 2023-01-09 RX ADMIN — Medication 100 MILLIGRAM(S): at 13:36

## 2023-01-09 RX ADMIN — ATORVASTATIN CALCIUM 40 MILLIGRAM(S): 80 TABLET, FILM COATED ORAL at 21:53

## 2023-01-09 RX ADMIN — Medication 0.25 MILLIGRAM(S): at 12:17

## 2023-01-09 RX ADMIN — MORPHINE SULFATE 2 MILLIGRAM(S): 50 CAPSULE, EXTENDED RELEASE ORAL at 02:15

## 2023-01-09 RX ADMIN — NYSTATIN CREAM 1 APPLICATION(S): 100000 CREAM TOPICAL at 06:57

## 2023-01-09 RX ADMIN — PREGABALIN 1000 MICROGRAM(S): 225 CAPSULE ORAL at 12:13

## 2023-01-09 RX ADMIN — QUETIAPINE FUMARATE 12.5 MILLIGRAM(S): 200 TABLET, FILM COATED ORAL at 21:53

## 2023-01-09 RX ADMIN — Medication 1 MILLIGRAM(S): at 12:13

## 2023-01-09 RX ADMIN — Medication 100 MILLIGRAM(S): at 06:58

## 2023-01-09 RX ADMIN — Medication 81 MILLIGRAM(S): at 12:14

## 2023-01-09 NOTE — PROGRESS NOTE ADULT - SUBJECTIVE AND OBJECTIVE BOX
PROGRESS NOTE:     Patient is a 72y old  Female who presents with a chief complaint of Failure to thrive (2023 23:40)        SUBJECTIVE & OBJECTIVE:   Pt seen and examined at bedside in AM    no overnight events.   no new complaints     REVIEW OF SYSTEMS: remaining ROS negative     PHYSICAL EXAM:  Vital Signs Last 24 Hrs  T(C): 37.2 (2023 11:08), Max: 37.2 (2023 23:37)  T(F): 99 (2023 11:08), Max: 99 (2023 23:37)  HR: 100 (2023 11:08) (100 - 105)  BP: 121/67 (2023 11:08) (116/75 - 125/77)  BP(mean): --  RR: 19 (2023 11:08) (18 - 19)  SpO2: 96% (2023 11:08) (94% - 96%)          GENERAL: NAD,   no increased WOB,   HEAD:  Atraumatic, Normocephalic  EYES: EOMI, PERRLA, conjunctiva and sclera clear  ENMT: Moist mucous membranes  NECK: Supple, No JVD  NERVOUS SYSTEM:  Alert & Oriented X 2-3, intermittent confusion, left sided facial droop and left sided weakness --appear old, speech is slightly slurred but overall coherent , follows commands , no issues with swallowing   CHEST/LUNG: Clear to auscultation bilaterally; No rales, rhonchi, wheezing, or rubs  HEART: Regular rate and rhythm; No murmurs, rubs, or gallops  ABDOMEN: Soft, Nontender, Nondistended; Bowel sounds present  EXTREMITIES:  no edema or calf tenderness b/l   BACK/SKIN: entire back and buttock appear raw and red, very tender to palpation, no open or draining wounds appreciated, but there are some satellite lesions on the buttocks.      MEDICATIONS  (STANDING):  aspirin  chewable 81 milliGRAM(s) Oral daily  atorvastatin 40 milliGRAM(s) Oral at bedtime  dextrose 5%. 1000 milliLiter(s) (100 mL/Hr) IV Continuous <Continuous>  dextrose 5%. 1000 milliLiter(s) (50 mL/Hr) IV Continuous <Continuous>  dextrose 50% Injectable 25 Gram(s) IV Push once  dextrose 50% Injectable 12.5 Gram(s) IV Push once  dextrose 50% Injectable 25 Gram(s) IV Push once  enoxaparin Injectable 40 milliGRAM(s) SubCutaneous every 24 hours  glucagon  Injectable 1 milliGRAM(s) IntraMuscular once  insulin glargine Injectable (LANTUS) 12 Unit(s) SubCutaneous at bedtime  insulin lispro (ADMELOG) corrective regimen sliding scale   SubCutaneous three times a day before meals  nystatin Powder 1 Application(s) Topical every 8 hours  pantoprazole    Tablet 40 milliGRAM(s) Oral before breakfast    MEDICATIONS  (PRN):  acetaminophen     Tablet .. 650 milliGRAM(s) Oral every 6 hours PRN Mild Pain (1 - 3)  dextrose Oral Gel 15 Gram(s) Oral once PRN Blood Glucose LESS THAN 70 milliGRAM(s)/deciliter      LABS:                                   9.1    11.14 )-----------( 326      ( 2023 06:45 )             29.6       138  |  108  |  13  ----------------------------<  151<H>  4.0   |  22  |  0.77    Ca    9.7      2023 06:45    TPro  4.8<L>  /  Alb  x   /  TBili  x   /  DBili  x   /  AST  x   /  ALT  x   /  AlkPhos  x         Urinalysis Basic - ( 2023 05:39 )    Color: Yellow / Appearance: Clear / S.025 / pH: x  Gluc: x / Ketone: Trace  / Bili: Small / Urobili: 1 mg/dL   Blood: x / Protein: 15 mg/dL / Nitrite: Negative   Leuk Esterase: Trace / RBC: 3-5 /HPF / WBC 0-2   Sq Epi: x / Non Sq Epi: x / Bacteria: x            RECENT CULTURES:  Urine culture:   @ 05:39 --   No growth  Urine culture:   @ 17:20 --   No growth to date.  Urine culture:   @ 17:15 --   No growth to date.    Clean Catch Clean Catch (Midstream)   @ 05:39   No growth  --  --      .Blood Blood-Peripheral   @ 17:20   No growth to date.  --  --      .Blood Blood-Peripheral   @ 17:15   No growth to date.  --  --            RADIOLOGY & ADDITIONAL TESTS:    < from: Xray Chest 1 View-PORTABLE IMMEDIATE (23 @ 17:13) >  ACC: [id number] EXAM:  XR CHEST PORTABLE IMMED 1V                          PROCEDURE DATE:  2023          INTERPRETATION:  INDICATION: Sepsis    COMPARISON: None    FINDINGS:  An AP portable supine view of the chest shows the patient rotated tothe   left however the visualized lungs are clear with no infiltrates seen on   either side. There is no pneumothorax. There is no pleural effusion. The   hilar, mediastinal structures and heart size cannot be assessed   accurately due to positioning. There is no CHF. There are degenerative   changes of the spine.    IMPRESSION:  1. While the patient is rotated, the visualized lungs are clear with no   acute cardiopulmonary abnormality seen.  2. Degenerative changes of the thoracic spine.    < end of copied text >    < from: Xray Hip 2-3 Views, Left (23 @ 11:04) >  PROCEDURE DATE:  2023          INTERPRETATION:  clinical history: Pain    Left hip 2 views    No fracture or dislocation. No focal osseous lesion. Left total hip   replacement is seen    IMPRESSION:   No fracture.    < end of copied text >        Imaging Personally Reviewed:  [ ] YES  [ ] NO    Consultant(s) Notes Reviewed:  [x ] YES  [ ] NO    Care Discussed with Consultants/Other Providers [x ] YES  [ ] NO  Care plan and all findings were discussed in detail with patient.  All questions and concerns addressed   PROGRESS NOTE:     Patient is a 72y old  Female who presents with a chief complaint of Failure to thrive (2023 23:40)        SUBJECTIVE & OBJECTIVE:   Pt seen and examined at bedside in AM    no overnight events.   no new complaints     REVIEW OF SYSTEMS: remaining ROS negative     PHYSICAL EXAM:  Vital Signs Last 24 Hrs  T(C): 37.2 (2023 11:08), Max: 37.2 (2023 23:37)  T(F): 99 (2023 11:08), Max: 99 (2023 23:37)  HR: 100 (2023 11:08) (100 - 105)  BP: 121/67 (2023 11:08) (116/75 - 125/77)  BP(mean): --  RR: 19 (2023 11:08) (18 - 19)  SpO2: 96% (2023 11:08) (94% - 96%)          GENERAL: NAD,   no increased WOB,   HEAD:  Atraumatic, Normocephalic  EYES: EOMI, PERRLA, conjunctiva and sclera clear  ENMT: Moist mucous membranes  NECK: Supple, No JVD  NERVOUS SYSTEM:  Alert & Oriented X 2-3, intermittent confusion, left sided facial droop and left sided weakness --appear old, speech is slightly slurred but overall coherent , follows commands , no issues with swallowing   CHEST/LUNG: Clear to auscultation bilaterally; No rales, rhonchi, wheezing, or rubs  HEART: Regular rate and rhythm; No murmurs, rubs, or gallops  ABDOMEN: Soft, Nontender, Nondistended; Bowel sounds present  EXTREMITIES:  no edema or calf tenderness b/l   BACK/SKIN: entire back and buttock appear raw and red, very tender to palpation, no open or draining wounds appreciated, but there are some satellite lesions on the buttocks.    back is now heeling and appears improved     MEDICATIONS  (STANDING):  aspirin  chewable 81 milliGRAM(s) Oral daily  atorvastatin 40 milliGRAM(s) Oral at bedtime  dextrose 5%. 1000 milliLiter(s) (100 mL/Hr) IV Continuous <Continuous>  dextrose 5%. 1000 milliLiter(s) (50 mL/Hr) IV Continuous <Continuous>  dextrose 50% Injectable 25 Gram(s) IV Push once  dextrose 50% Injectable 12.5 Gram(s) IV Push once  dextrose 50% Injectable 25 Gram(s) IV Push once  enoxaparin Injectable 40 milliGRAM(s) SubCutaneous every 24 hours  glucagon  Injectable 1 milliGRAM(s) IntraMuscular once  insulin glargine Injectable (LANTUS) 12 Unit(s) SubCutaneous at bedtime  insulin lispro (ADMELOG) corrective regimen sliding scale   SubCutaneous three times a day before meals  nystatin Powder 1 Application(s) Topical every 8 hours  pantoprazole    Tablet 40 milliGRAM(s) Oral before breakfast    MEDICATIONS  (PRN):  acetaminophen     Tablet .. 650 milliGRAM(s) Oral every 6 hours PRN Mild Pain (1 - 3)  dextrose Oral Gel 15 Gram(s) Oral once PRN Blood Glucose LESS THAN 70 milliGRAM(s)/deciliter      LABS:                                   9.1    11.14 )-----------( 326      ( 2023 06:45 )             29.6       138  |  108  |  13  ----------------------------<  151<H>  4.0   |  22  |  0.77    Ca    9.7      2023 06:45    TPro  4.8<L>  /  Alb  x   /  TBili  x   /  DBili  x   /  AST  x   /  ALT  x   /  AlkPhos  x         Urinalysis Basic - ( 2023 05:39 )    Color: Yellow / Appearance: Clear / S.025 / pH: x  Gluc: x / Ketone: Trace  / Bili: Small / Urobili: 1 mg/dL   Blood: x / Protein: 15 mg/dL / Nitrite: Negative   Leuk Esterase: Trace / RBC: 3-5 /HPF / WBC 0-2   Sq Epi: x / Non Sq Epi: x / Bacteria: x            RECENT CULTURES:  Urine culture:   @ 05:39 --   No growth  Urine culture:   @ 17:20 --   No growth to date.  Urine culture:   @ 17:15 --   No growth to date.    Clean Catch Clean Catch (Midstream)   @ 05:39   No growth  --  --      .Blood Blood-Peripheral   @ 17:20   No growth to date.  --  --      .Blood Blood-Peripheral   @ 17:15   No growth to date.  --  --            RADIOLOGY & ADDITIONAL TESTS:    < from: Xray Chest 1 View-PORTABLE IMMEDIATE (23 @ 17:13) >  ACC: [id number] EXAM:  XR CHEST PORTABLE IMMED 1V                          PROCEDURE DATE:  2023          INTERPRETATION:  INDICATION: Sepsis    COMPARISON: None    FINDINGS:  An AP portable supine view of the chest shows the patient rotated tothe   left however the visualized lungs are clear with no infiltrates seen on   either side. There is no pneumothorax. There is no pleural effusion. The   hilar, mediastinal structures and heart size cannot be assessed   accurately due to positioning. There is no CHF. There are degenerative   changes of the spine.    IMPRESSION:  1. While the patient is rotated, the visualized lungs are clear with no   acute cardiopulmonary abnormality seen.  2. Degenerative changes of the thoracic spine.    < end of copied text >    < from: Xray Hip 2-3 Views, Left (23 @ 11:04) >  PROCEDURE DATE:  2023          INTERPRETATION:  clinical history: Pain    Left hip 2 views    No fracture or dislocation. No focal osseous lesion. Left total hip   replacement is seen    IMPRESSION:   No fracture.    < end of copied text >        Imaging Personally Reviewed:  [ ] YES  [ ] NO    Consultant(s) Notes Reviewed:  [x ] YES  [ ] NO    Care Discussed with Consultants/Other Providers [x ] YES  [ ] NO  Care plan and all findings were discussed in detail with patient.  All questions and concerns addressed

## 2023-01-09 NOTE — DIETITIAN INITIAL EVALUATION ADULT - NS FNS DIET ORDER
Diet, Pureed:   Consistent Carbohydrate {Evening Snack}  Low Sodium  Supplement Feeding Modality:  Oral  Ensure Clear Cans or Servings Per Day:  1       Frequency:  Two Times a day (01-08-23 @ 13:30)

## 2023-01-09 NOTE — DIETITIAN NUTRITION RISK NOTIFICATION - TREATMENT: THE FOLLOWING DIET HAS BEEN RECOMMENDED
Diet, Pureed:   Consistent Carbohydrate {Evening Snack}  Low Sodium  Supplement Feeding Modality:  Oral  Ensure Clear Cans or Servings Per Day:  1       Frequency:  Two Times a day (01-08-23 @ 13:30) [Active]

## 2023-01-09 NOTE — DIETITIAN INITIAL EVALUATION ADULT - PERTINENT LABORATORY DATA
01-09    138  |  108  |  13  ----------------------------<  151<H>  4.0   |  22  |  0.77    Ca    9.7      09 Jan 2023 06:45    TPro  4.8<L>  /  Alb  x   /  TBili  x   /  DBili  x   /  AST  x   /  ALT  x   /  AlkPhos  x   01-08  POCT Blood Glucose.: 169 mg/dL (01-09-23); 01-08 161, 137, 145, 234  A1C with Estimated Average Glucose Result: 7.6 %, 171 (01-06-23)

## 2023-01-09 NOTE — PROGRESS NOTE ADULT - ASSESSMENT
72 F with hx of HTN, non-IDMMT2, presents from home for inability to care for herself via Adult Protective Services.     1/7/23 elevated d-dimer,  LE duplex showed Left soleal vein deep vein thrombosis. Acute deep venous thrombosis: below the knee.   eliquis was started   1/8/23 tachycardia most likely d/t agitation and pain ; refusing meds and care at times, most likely some component of delirium and pain     possibly fungal infection /pressure injury of back d/t prolonged sitting in her feces/urine   --wound care appreciated   --nystatin ointment to affected areas (nystatin powder does not stick to affected wounds)  --if able , will attempt dermatology eval although difficult in this hospital as dermatology specialty is not available. This is not a derm urgency or emergency and will continue to monitor progress   --ID following   1/8/23 added ancef to cover for superimposed cellulitis of her back wounds  discussed with RN, please leave affected area to air dry after nystatin ointment        Problem/Plan - 1:  ·  Problem: Self neglect.   ·  Plan: Found with feces, cockroaches, has been unable to get up from her couch x months.   Was trying to care for herself with help of neighbor.   - SW consult to f/up if open APS case  - Wound care consult for rashes + likely pressure ulcer on back  - Cousin Munira left 3rd number to contact in case of emergency,  Eleazar [phone] along with  or . Unclear if patient able to make DNR decision today, will need Munira to fax over HCP and DNR forms tomorrow morning  - Porter chelsea number [phone].     Problem/Plan - 2:  ·  Problem: CVA (cerebrovascular accident).   ·  Plan: On CT. May be cause of L lower extremity weakness.   Start aspirin, atorvastatin, monitor on tele and check echo  Check L hip x-ray given hx of hip replacement  - PT/OT.     Problem/Plan - 3:  ·  Problem: Elevated serum creatinine.   ·  Plan: May have CKD vs ROGE  Trend in AM after fluids.     Problem/Plan - 4:  ·  Problem: Anemia.  ·  Plan: Unclear baseline, may be 2/2 CKD  Check iron studies, FOBT.     Problem/Plan - 5:  ·  Problem: Hypertension.  ·  Plan: Patient unclear about dosing, restart BP meds as needed.     Problem/Plan - 6:  ·  Problem: Diabetes mellitus.   A1c 7.6%   ·  Plan: On metformin only,  LDISS    Has elevated TSH to 4, check T4 however within goal for elderly.    hypercalcemia --follow UPEP/SPEP    Vit D deficiency --supplement     DNR/DNI   fall, aspiration precautions, HOBE

## 2023-01-09 NOTE — DIETITIAN INITIAL EVALUATION ADULT - ETIOLOGY
Inadequate energy/protein intake related to AMS, social circumstances (difficulty procuring/preparing food)

## 2023-01-09 NOTE — DIETITIAN INITIAL EVALUATION ADULT - PHYSICAL ASSESSMENT DORSAL HAND
MS RN NOTES

CALM AND QUIET THIS TIME,BREATHING REGULAR,NOT IN ANY FORM OF DISTRESS.PAIN MANAGEMENT 
EFFECTIVE,DR SINHA FOR SURGICAL INTERVENTION,AWAITING TO KNOW WHAT KIND OF PROSTHESIS DOES 
PATIENT HAVE. severe

## 2023-01-09 NOTE — DIETITIAN INITIAL EVALUATION ADULT - OTHER INFO
Pt able to answer some questions; most information obtained from medical record. Pt presented to ED for inability to take care of self via Adult Protective Services (APS).  Pt neighbor, Porter, was assisting her & providing food as pt was unable to ambulate; on couch x 3 months; found covered in feces/urine; using a bucket for bathroom; home infested c cockroaches.  When neighbor found pt c AMS he contacted [facility] who in turn, got APS involved.  Pt has a cousin who speaks c pt regularly but hasn't visited since 11.2021; she is pt HCP.  Pt reports good appetite; no N/V/C/D or chew/swallowing difficulty. Pt will most likely go to [facility] to LTC upon d/c

## 2023-01-09 NOTE — DIETITIAN INITIAL EVALUATION ADULT - PERTINENT MEDS FT
Eliquis, Ancef, lactated ringers, Lantus, Admelog sliding scale, Xanax, vitamin D & B12, Milk of Magnesia, Seroquel Senna folic acid, Morphine, Percocet, Lipitor, Protonix

## 2023-01-10 LAB
CULTURE RESULTS: SIGNIFICANT CHANGE UP
CULTURE RESULTS: SIGNIFICANT CHANGE UP
GLUCOSE BLDC GLUCOMTR-MCNC: 204 MG/DL — HIGH (ref 70–99)
GLUCOSE BLDC GLUCOMTR-MCNC: 241 MG/DL — HIGH (ref 70–99)
GLUCOSE BLDC GLUCOMTR-MCNC: 269 MG/DL — HIGH (ref 70–99)
GLUCOSE BLDC GLUCOMTR-MCNC: 312 MG/DL — HIGH (ref 70–99)
SPECIMEN SOURCE: SIGNIFICANT CHANGE UP
SPECIMEN SOURCE: SIGNIFICANT CHANGE UP

## 2023-01-10 PROCEDURE — 99232 SBSQ HOSP IP/OBS MODERATE 35: CPT

## 2023-01-10 RX ORDER — INSULIN LISPRO 100/ML
2 VIAL (ML) SUBCUTANEOUS
Refills: 0 | Status: DISCONTINUED | OUTPATIENT
Start: 2023-01-10 | End: 2023-01-17

## 2023-01-10 RX ADMIN — PREGABALIN 1000 MICROGRAM(S): 225 CAPSULE ORAL at 12:08

## 2023-01-10 RX ADMIN — PANTOPRAZOLE SODIUM 40 MILLIGRAM(S): 20 TABLET, DELAYED RELEASE ORAL at 07:38

## 2023-01-10 RX ADMIN — Medication 3: at 11:27

## 2023-01-10 RX ADMIN — APIXABAN 10 MILLIGRAM(S): 2.5 TABLET, FILM COATED ORAL at 17:28

## 2023-01-10 RX ADMIN — MORPHINE SULFATE 2 MILLIGRAM(S): 50 CAPSULE, EXTENDED RELEASE ORAL at 05:47

## 2023-01-10 RX ADMIN — ATORVASTATIN CALCIUM 40 MILLIGRAM(S): 80 TABLET, FILM COATED ORAL at 22:14

## 2023-01-10 RX ADMIN — Medication 2000 UNIT(S): at 12:08

## 2023-01-10 RX ADMIN — NYSTATIN CREAM 1 APPLICATION(S): 100000 CREAM TOPICAL at 05:48

## 2023-01-10 RX ADMIN — NYSTATIN CREAM 1 APPLICATION(S): 100000 CREAM TOPICAL at 17:27

## 2023-01-10 RX ADMIN — Medication 1 MILLIGRAM(S): at 12:08

## 2023-01-10 RX ADMIN — Medication 100 MILLIGRAM(S): at 22:15

## 2023-01-10 RX ADMIN — QUETIAPINE FUMARATE 12.5 MILLIGRAM(S): 200 TABLET, FILM COATED ORAL at 22:14

## 2023-01-10 RX ADMIN — MAGNESIUM HYDROXIDE 30 MILLILITER(S): 400 TABLET, CHEWABLE ORAL at 07:40

## 2023-01-10 RX ADMIN — Medication 81 MILLIGRAM(S): at 12:08

## 2023-01-10 RX ADMIN — SENNA PLUS 2 TABLET(S): 8.6 TABLET ORAL at 22:14

## 2023-01-10 RX ADMIN — Medication 100 MILLIGRAM(S): at 14:25

## 2023-01-10 RX ADMIN — MORPHINE SULFATE 2 MILLIGRAM(S): 50 CAPSULE, EXTENDED RELEASE ORAL at 06:02

## 2023-01-10 RX ADMIN — Medication 2: at 07:38

## 2023-01-10 RX ADMIN — MORPHINE SULFATE 2 MILLIGRAM(S): 50 CAPSULE, EXTENDED RELEASE ORAL at 13:26

## 2023-01-10 RX ADMIN — Medication 4: at 16:43

## 2023-01-10 RX ADMIN — APIXABAN 10 MILLIGRAM(S): 2.5 TABLET, FILM COATED ORAL at 05:49

## 2023-01-10 RX ADMIN — Medication 100 MILLIGRAM(S): at 05:49

## 2023-01-10 RX ADMIN — MORPHINE SULFATE 2 MILLIGRAM(S): 50 CAPSULE, EXTENDED RELEASE ORAL at 14:30

## 2023-01-10 RX ADMIN — INSULIN GLARGINE 12 UNIT(S): 100 INJECTION, SOLUTION SUBCUTANEOUS at 22:15

## 2023-01-10 NOTE — PROGRESS NOTE ADULT - SUBJECTIVE AND OBJECTIVE BOX
PROGRESS NOTE:     Patient is a 72y old  Female who presents with a chief complaint of Failure to thrive (2023 23:40)        SUBJECTIVE & OBJECTIVE:   Pt seen and examined at bedside in AM    no overnight events.   no new complaints     REVIEW OF SYSTEMS: remaining ROS negative     PHYSICAL EXAM:  Vital Signs Last 24 Hrs  T(C): 36.9 (10 Rajinder 2023 17:00), Max: 37.6 (2023 23:14)  T(F): 98.4 (10 Rajinder 2023 17:00), Max: 99.7 (2023 23:14)  HR: 103 (10 Rajinder 2023 17:00) (83 - 109)  BP: 114/69 (10 Rajinder 2023 17:00) (114/69 - 149/85)  BP(mean): --  RR: 17 (10 Rajinder 2023 17:00) (17 - 18)  SpO2: 97% (10 Rajinder 2023 17:00) (96% - 99%)          GENERAL: NAD,   no increased WOB,   HEAD:  Atraumatic, Normocephalic  EYES: EOMI, PERRLA, conjunctiva and sclera clear  ENMT: Moist mucous membranes  NECK: Supple, No JVD  NERVOUS SYSTEM:  Alert & Oriented X 2-3, intermittent confusion, left sided facial droop and left sided weakness --appear old, speech is slightly slurred but overall coherent , follows commands , no issues with swallowing   CHEST/LUNG: Clear to auscultation bilaterally; No rales, rhonchi, wheezing, or rubs  HEART: Regular rate and rhythm; No murmurs, rubs, or gallops  ABDOMEN: Soft, Nontender, Nondistended; Bowel sounds present  EXTREMITIES:  no edema or calf tenderness b/l   BACK/SKIN: entire back and buttock appear raw and red, very tender to palpation, no open or draining wounds appreciated, but there are some satellite lesions on the buttocks.      MEDICATIONS  (STANDING):  aspirin  chewable 81 milliGRAM(s) Oral daily  atorvastatin 40 milliGRAM(s) Oral at bedtime  dextrose 5%. 1000 milliLiter(s) (100 mL/Hr) IV Continuous <Continuous>  dextrose 5%. 1000 milliLiter(s) (50 mL/Hr) IV Continuous <Continuous>  dextrose 50% Injectable 25 Gram(s) IV Push once  dextrose 50% Injectable 12.5 Gram(s) IV Push once  dextrose 50% Injectable 25 Gram(s) IV Push once  enoxaparin Injectable 40 milliGRAM(s) SubCutaneous every 24 hours  glucagon  Injectable 1 milliGRAM(s) IntraMuscular once  insulin glargine Injectable (LANTUS) 12 Unit(s) SubCutaneous at bedtime  insulin lispro (ADMELOG) corrective regimen sliding scale   SubCutaneous three times a day before meals  nystatin Powder 1 Application(s) Topical every 8 hours  pantoprazole    Tablet 40 milliGRAM(s) Oral before breakfast    MEDICATIONS  (PRN):  acetaminophen     Tablet .. 650 milliGRAM(s) Oral every 6 hours PRN Mild Pain (1 - 3)  dextrose Oral Gel 15 Gram(s) Oral once PRN Blood Glucose LESS THAN 70 milliGRAM(s)/deciliter      LABS:                                              9.1    11.14 )-----------( 326      ( 2023 06:45 )             29.6       138  |  108  |  13  ----------------------------<  151<H>  4.0   |  22  |  0.77    Ca    9.7      2023 06:45          Urinalysis Basic - ( 2023 05:39 )    Color: Yellow / Appearance: Clear / S.025 / pH: x  Gluc: x / Ketone: Trace  / Bili: Small / Urobili: 1 mg/dL   Blood: x / Protein: 15 mg/dL / Nitrite: Negative   Leuk Esterase: Trace / RBC: 3-5 /HPF / WBC 0-2   Sq Epi: x / Non Sq Epi: x / Bacteria: x            RECENT CULTURES:  Urine culture:   @ 05:39 --   No growth  Urine culture:   @ 17:20 --   No growth to date.  Urine culture:   @ 17:15 --   No growth to date.    Clean Catch Clean Catch (Midstream)   @ 05:39   No growth  --  --      .Blood Blood-Peripheral   @ 17:20   No growth to date.  --  --      .Blood Blood-Peripheral   @ 17:15   No growth to date.  --  --            RADIOLOGY & ADDITIONAL TESTS:    < from: Xray Chest 1 View-PORTABLE IMMEDIATE (23 @ 17:13) >  ACC: [id number] EXAM:  XR CHEST PORTABLE IMMED 1V                          PROCEDURE DATE:  2023          INTERPRETATION:  INDICATION: Sepsis    COMPARISON: None    FINDINGS:  An AP portable supine view of the chest shows the patient rotated tothe   left however the visualized lungs are clear with no infiltrates seen on   either side. There is no pneumothorax. There is no pleural effusion. The   hilar, mediastinal structures and heart size cannot be assessed   accurately due to positioning. There is no CHF. There are degenerative   changes of the spine.    IMPRESSION:  1. While the patient is rotated, the visualized lungs are clear with no   acute cardiopulmonary abnormality seen.  2. Degenerative changes of the thoracic spine.    < end of copied text >    < from: Xray Hip 2-3 Views, Left (23 @ 11:04) >  PROCEDURE DATE:  2023          INTERPRETATION:  clinical history: Pain    Left hip 2 views    No fracture or dislocation. No focal osseous lesion. Left total hip   replacement is seen    IMPRESSION:   No fracture.    < end of copied text >        Imaging Personally Reviewed:  [ ] YES  [ ] NO    Consultant(s) Notes Reviewed:  [x ] YES  [ ] NO    Care Discussed with Consultants/Other Providers [x ] YES  [ ] NO  Care plan and all findings were discussed in detail with patient.  All questions and concerns addressed

## 2023-01-10 NOTE — PROGRESS NOTE ADULT - ASSESSMENT
72 F with hx of HTN, non-IDMMT2, presents from home for inability to care for herself via Adult Protective Services.     1/7/23 elevated d-dimer,  LE duplex showed Left soleal vein deep vein thrombosis. Acute deep venous thrombosis: below the knee.   eliquis was started   1/8/23 tachycardia most likely d/t agitation and pain ; refusing meds and care at times, most likely some component of delirium and pain     possibly fungal infection /pressure injury of back d/t prolonged sitting in her feces/urine   --wound care appreciated   --nystatin ointment to affected areas (nystatin powder does not stick to affected wounds)  --if able , will attempt dermatology eval although difficult in this hospital as dermatology specialty is not available. This is not a derm urgency or emergency and will continue to monitor progress   --ID following   1/8/23 added ancef to cover for superimposed cellulitis of her back wounds x 5 days   discussed with RN, please leave affected area to air dry after nystatin ointment       Self neglect.   ·  Plan: Found with feces, cockroaches, has been unable to get up from her couch x months.   Was trying to care for herself with help of neighbor.   - SW consult to f/up if open APS case  - Wound care consult for rashes + likely pressure ulcer on back  - Cousin Munira left 3rd number to contact in case of emergency,  Eleazar [phone] along with  or . Unclear if patient able to make DNR decision today, will need Munira to fax over HCP and DNR forms tomorrow morning  - Porter chelsea number [phone].    #H/O CVA (cerebrovascular accident) 2002 with left sided deficits   CT head: no acute findings   ECHO  continue with ASA, statin     history of Left hip replacement     L hip x-ray --without findings   follow knee xrays       AOCD  ·  Plan: Unclear baseline, may be 2/2 CKD  no e/o bleeding or bruising   H/H stable     Hypertension.  BP OK off meds      Diabetes mellitus.   A1c 7.6%   ISS, lantus   FS goal 140-180     Has elevated TSH to 4, check T4 however within goal for elderly.    hypercalcemia --follow UPEP/SPEP    Vit D deficiency --supplement     DNR/DNI   fall, aspiration precautions, HOBE

## 2023-01-11 LAB
% GAMMA, URINE: 6.2 % — SIGNIFICANT CHANGE UP
ALBUMIN 24H MFR UR ELPH: 20.1 % — SIGNIFICANT CHANGE UP
ALPHA1 GLOB 24H MFR UR ELPH: 43.4 % — SIGNIFICANT CHANGE UP
ALPHA2 GLOB 24H MFR UR ELPH: 16.3 % — SIGNIFICANT CHANGE UP
ANION GAP SERPL CALC-SCNC: 8 MMOL/L — SIGNIFICANT CHANGE UP (ref 5–17)
B-GLOBULIN 24H MFR UR ELPH: 14 % — SIGNIFICANT CHANGE UP
BUN SERPL-MCNC: 10 MG/DL — SIGNIFICANT CHANGE UP (ref 7–23)
CALCIUM SERPL-MCNC: 9.5 MG/DL — SIGNIFICANT CHANGE UP (ref 8.5–10.1)
CHLORIDE SERPL-SCNC: 107 MMOL/L — SIGNIFICANT CHANGE UP (ref 96–108)
CO2 SERPL-SCNC: 23 MMOL/L — SIGNIFICANT CHANGE UP (ref 22–31)
CREAT SERPL-MCNC: 0.73 MG/DL — SIGNIFICANT CHANGE UP (ref 0.5–1.3)
EGFR: 87 ML/MIN/1.73M2 — SIGNIFICANT CHANGE UP
GLUCOSE BLDC GLUCOMTR-MCNC: 214 MG/DL — HIGH (ref 70–99)
GLUCOSE BLDC GLUCOMTR-MCNC: 264 MG/DL — HIGH (ref 70–99)
GLUCOSE BLDC GLUCOMTR-MCNC: 316 MG/DL — HIGH (ref 70–99)
GLUCOSE BLDC GLUCOMTR-MCNC: 333 MG/DL — HIGH (ref 70–99)
GLUCOSE SERPL-MCNC: 202 MG/DL — HIGH (ref 70–99)
HCT VFR BLD CALC: 27.1 % — LOW (ref 34.5–45)
HGB BLD-MCNC: 8.7 G/DL — LOW (ref 11.5–15.5)
INTERPRETATION 24H UR IFE-IMP: SIGNIFICANT CHANGE UP
M PROTEIN 24H UR ELPH-MRATE: SIGNIFICANT CHANGE UP
MAGNESIUM SERPL-MCNC: 1.5 MG/DL — LOW (ref 1.6–2.6)
MCHC RBC-ENTMCNC: 25.4 PG — LOW (ref 27–34)
MCHC RBC-ENTMCNC: 32.1 G/DL — SIGNIFICANT CHANGE UP (ref 32–36)
MCV RBC AUTO: 79 FL — LOW (ref 80–100)
NRBC # BLD: 0 /100 WBCS — SIGNIFICANT CHANGE UP (ref 0–0)
PHOSPHATE SERPL-MCNC: 2 MG/DL — LOW (ref 2.5–4.5)
PLATELET # BLD AUTO: 285 K/UL — SIGNIFICANT CHANGE UP (ref 150–400)
POTASSIUM SERPL-MCNC: 3.9 MMOL/L — SIGNIFICANT CHANGE UP (ref 3.5–5.3)
POTASSIUM SERPL-SCNC: 3.9 MMOL/L — SIGNIFICANT CHANGE UP (ref 3.5–5.3)
PROT PATTERN 24H UR ELPH-IMP: SIGNIFICANT CHANGE UP
RBC # BLD: 3.43 M/UL — LOW (ref 3.8–5.2)
RBC # FLD: 15.2 % — HIGH (ref 10.3–14.5)
SODIUM SERPL-SCNC: 138 MMOL/L — SIGNIFICANT CHANGE UP (ref 135–145)
TOTAL VOLUME - 24 HOUR: SIGNIFICANT CHANGE UP ML
URINE CREATININE CALCULATION: SIGNIFICANT CHANGE UP G/24 H (ref 0.8–1.8)
WBC # BLD: 11.19 K/UL — HIGH (ref 3.8–10.5)
WBC # FLD AUTO: 11.19 K/UL — HIGH (ref 3.8–10.5)

## 2023-01-11 PROCEDURE — 99232 SBSQ HOSP IP/OBS MODERATE 35: CPT

## 2023-01-11 RX ORDER — INSULIN LISPRO 100/ML
6 VIAL (ML) SUBCUTANEOUS ONCE
Refills: 0 | Status: COMPLETED | OUTPATIENT
Start: 2023-01-11 | End: 2023-01-11

## 2023-01-11 RX ORDER — MAGNESIUM SULFATE 500 MG/ML
2 VIAL (ML) INJECTION ONCE
Refills: 0 | Status: COMPLETED | OUTPATIENT
Start: 2023-01-11 | End: 2023-01-11

## 2023-01-11 RX ADMIN — QUETIAPINE FUMARATE 12.5 MILLIGRAM(S): 200 TABLET, FILM COATED ORAL at 21:35

## 2023-01-11 RX ADMIN — Medication 2 UNIT(S): at 11:38

## 2023-01-11 RX ADMIN — Medication 81 MILLIGRAM(S): at 11:38

## 2023-01-11 RX ADMIN — Medication 100 MILLIGRAM(S): at 14:58

## 2023-01-11 RX ADMIN — NYSTATIN CREAM 1 APPLICATION(S): 100000 CREAM TOPICAL at 17:37

## 2023-01-11 RX ADMIN — Medication 100 MILLIGRAM(S): at 05:04

## 2023-01-11 RX ADMIN — Medication 2 UNIT(S): at 17:19

## 2023-01-11 RX ADMIN — Medication 100 MILLIGRAM(S): at 21:34

## 2023-01-11 RX ADMIN — INSULIN GLARGINE 12 UNIT(S): 100 INJECTION, SOLUTION SUBCUTANEOUS at 21:35

## 2023-01-11 RX ADMIN — Medication 1 MILLIGRAM(S): at 11:38

## 2023-01-11 RX ADMIN — Medication 1 TABLET(S): at 11:38

## 2023-01-11 RX ADMIN — MORPHINE SULFATE 2 MILLIGRAM(S): 50 CAPSULE, EXTENDED RELEASE ORAL at 05:02

## 2023-01-11 RX ADMIN — APIXABAN 10 MILLIGRAM(S): 2.5 TABLET, FILM COATED ORAL at 17:35

## 2023-01-11 RX ADMIN — PANTOPRAZOLE SODIUM 40 MILLIGRAM(S): 20 TABLET, DELAYED RELEASE ORAL at 08:28

## 2023-01-11 RX ADMIN — Medication 6 UNIT(S): at 22:00

## 2023-01-11 RX ADMIN — SENNA PLUS 2 TABLET(S): 8.6 TABLET ORAL at 21:39

## 2023-01-11 RX ADMIN — APIXABAN 10 MILLIGRAM(S): 2.5 TABLET, FILM COATED ORAL at 05:02

## 2023-01-11 RX ADMIN — MORPHINE SULFATE 2 MILLIGRAM(S): 50 CAPSULE, EXTENDED RELEASE ORAL at 05:30

## 2023-01-11 RX ADMIN — Medication 2: at 08:27

## 2023-01-11 RX ADMIN — NYSTATIN CREAM 1 APPLICATION(S): 100000 CREAM TOPICAL at 05:02

## 2023-01-11 RX ADMIN — ATORVASTATIN CALCIUM 40 MILLIGRAM(S): 80 TABLET, FILM COATED ORAL at 21:34

## 2023-01-11 RX ADMIN — Medication 2000 UNIT(S): at 17:29

## 2023-01-11 RX ADMIN — Medication 2 UNIT(S): at 08:27

## 2023-01-11 RX ADMIN — Medication 3: at 11:37

## 2023-01-11 RX ADMIN — Medication 4: at 17:18

## 2023-01-11 RX ADMIN — PREGABALIN 1000 MICROGRAM(S): 225 CAPSULE ORAL at 11:38

## 2023-01-11 RX ADMIN — Medication 25 GRAM(S): at 21:34

## 2023-01-11 NOTE — OCCUPATIONAL THERAPY INITIAL EVALUATION ADULT - GENERAL OBSERVATIONS, REHAB EVAL
Pt was encountered in sidelying position in bed with visitor present; NAD, PIV +, Vance +, alert, followed commands, uncooperative at times (Did not want to engage in session, but after encouragement, pt was willing to participate); pt c/o pain in back and knee region which impacts pts ability to perform functional tasks/transfers and mobility.

## 2023-01-11 NOTE — OCCUPATIONAL THERAPY INITIAL EVALUATION ADULT - ADL RETRAINING, OT EVAL
Patient will be able to perform functional tasks with assist, using least restrictive device, within 8-10 weeks.

## 2023-01-11 NOTE — OCCUPATIONAL THERAPY INITIAL EVALUATION ADULT - BED MOBILITY TRAINING, PT EVAL
Patient will be able to perform bed mobility tasks with assist, using least restrictive device, within 8-10 weeks.

## 2023-01-11 NOTE — PROGRESS NOTE ADULT - ASSESSMENT
72 F with hx of HTN, non-IDMMT2, presents from home for inability to care for herself via Adult Protective Services.     1/7/23 elevated d-dimer,  LE duplex showed Left soleal vein deep vein thrombosis. Acute deep venous thrombosis: below the knee.   eliquis was started   1/8/23 tachycardia most likely d/t agitation and pain ; refusing meds and care at times, most likely some component of delirium and pain     possibly fungal infection /pressure injury of back d/t prolonged sitting in her feces/urine   --wound care appreciated   --nystatin ointment to affected areas (nystatin powder does not stick to affected wounds)  --if able , will attempt dermatology eval although difficult in this hospital as dermatology specialty is not available. This is not a derm urgency or emergency and will continue to monitor progress   --ID following   1/8/23 added ancef to cover for superimposed cellulitis of her back wounds x 5 days   discussed with RN, please leave affected area to air dry after nystatin ointment       Self neglect.   ·  Plan: Found with feces, cockroaches, has been unable to get up from her couch x months.   Was trying to care for herself with help of neighbor.   - SW consult to f/up if open APS case  - Wound care consult for rashes + likely pressure ulcer on back  - Cousin Munira left 3rd number to contact in case of emergency,  Eleazar [phone] along with  or .   - Porter neighbor number [phone].    #H/O CVA (cerebrovascular accident) 2002 with left sided deficits   CT head: no acute findings   ECHO  continue with ASA, statin     history of Left hip replacement     L hip x-ray --without findings   follow knee xrays       AOCD  ·  Plan: Unclear baseline, may be 2/2 CKD  no e/o bleeding or bruising   H/H stable     Hypertension.  BP OK off meds      Diabetes mellitus.   A1c 7.6%   ISS, lantus   FS goal 140-180     Has elevated TSH to 4, check T4 however within goal for elderly.    hypercalcemia --follow UPEP/SPEP    Vit D deficiency --supplement   hypomagnesemia replace    DNR/DNI   fall, aspiration precautions, HOBE     MARAL placement

## 2023-01-11 NOTE — PHYSICAL THERAPY INITIAL EVALUATION ADULT - PERTINENT HX OF CURRENT PROBLEM, REHAB EVAL
Patient is a 71 y/o female admitted to [facility] due to inability to care for herself via Adult Protective Services. X-ray of left hip and both knees showed no fracture. Doppler of BLE showed +DVT to LLE and pt is on Eliquis.
Patient is a 71 y/o female admitted to [facility] due to inability to care for herself via Adult Protective Services.

## 2023-01-11 NOTE — PHYSICAL THERAPY INITIAL EVALUATION ADULT - IMPAIRMENTS FOUND, PT EVAL
aerobic capacity/endurance/muscle strength/neuromotor development and sensory integration/poor safety awareness/posture/ROM

## 2023-01-11 NOTE — PHYSICAL THERAPY INITIAL EVALUATION ADULT - MODALITIES TREATMENT COMMENTS
Pt presents c red rashes to whole lower back, left buttocks, and left hip areas c possibility of fungal infection. Some areas noted c oozing clear fluid and also noted c satellite lesions on surrounding areas.  Pt also presents c pressure injury to right mid-back area-Stage II c minimal exudates, intact periwound, and no odor.
Pt presents c red rashes to whole lower back, left buttocks, and left hip areas c possibility of fungal infection. Some areas noted c oozing clear fluid and also noted c satellite lesions on surrounding areas.

## 2023-01-11 NOTE — OCCUPATIONAL THERAPY INITIAL EVALUATION ADULT - NS ASR FOLLOW COMMAND OT EVAL
75% of the time/50% of the time/able to follow single-step instructions/unable to follow multi-step instructions

## 2023-01-11 NOTE — PHYSICAL THERAPY INITIAL EVALUATION ADULT - ADDITIONAL COMMENTS
Pt is a poor historian.
As per visitor(Elroy, cousin?), pt lives alone in a pvt house c 4 entry steps c B/L rails, all amenities on the 1st floor. Non-ambulatory at baseline. Pt has been bedbound for 2 weeks and normally uses w/c for locomotion. As per SW notes, pt has not been able to get off her couch for approx 3 months. Pt was using a bucket as the bathroom.

## 2023-01-11 NOTE — OCCUPATIONAL THERAPY INITIAL EVALUATION ADULT - STRENGTHENING, PT EVAL
Pt will increase BUE/BLE strength to 5/5 to improve functional strength needed to engage in functional tasks by 8-10 weeks

## 2023-01-11 NOTE — OCCUPATIONAL THERAPY INITIAL EVALUATION ADULT - BALANCE TRAINING, PT EVAL
Patient will be able to increase static and dynamic sitting/standing by 1/2 grade in order to participate in self care tasks and functional mobility/transfers within 8-10 weeks

## 2023-01-11 NOTE — PHYSICAL THERAPY INITIAL EVALUATION ADULT - GENERAL OBSERVATIONS, REHAB EVAL
Chart (EMR) reviewed. X-ray result of left hip pending. Held PT Evaluation at this time. Pt seen for wound care assessment. NEVAEH Zamora present. Received quarterly turned to left side c HOB elevated, NAD. +telemetry, +heplock. Alert. Ox2. Periods of confusion noted. Able to follow simple commands/directions.
Chart (EMR) reviewed. Pt seen for PT Evaluation and wound care re-assessment. OT Dionisio present. Received sidelying to right side c HOB elevated, NAD. Visitor present in the room. +IV intact, +primafit. Alert. Ox2. Able to follow simple commands/directions.

## 2023-01-11 NOTE — OCCUPATIONAL THERAPY INITIAL EVALUATION ADULT - ADDITIONAL COMMENTS
As per visitor (Guillermo Abbasi stated he is her cousin? Name not showing up in emergency contact list), Pt lives alone in a pvt house with 4 entry steps c B/L rails, all amenities on the 1st floor. Non-ambulatory at baseline for 2+ weeks. Pt has been bedbound for 2 weeks and normally uses w/c for locomotion. As per SW notes, pt has not been able to get off her couch for approx 3 months. Pt was using a bucket as the bathroom.

## 2023-01-11 NOTE — PROGRESS NOTE ADULT - SUBJECTIVE AND OBJECTIVE BOX
Patient is a 72y old  Female who presents with a chief complaint of Failure to thrive (10 Rajinder 2023 22:51)    INTERVAL HPI/OVERNIGHT EVENTS: no events     MEDICATIONS  (STANDING):  apixaban 10 milliGRAM(s) Oral every 12 hours  aspirin  chewable 81 milliGRAM(s) Oral daily  atorvastatin 40 milliGRAM(s) Oral at bedtime  ceFAZolin   IVPB      ceFAZolin   IVPB 1000 milliGRAM(s) IV Intermittent every 8 hours  cholecalciferol 2000 Unit(s) Oral daily  cyanocobalamin 1000 MICROGram(s) Oral daily  dextrose 5%. 1000 milliLiter(s) (100 mL/Hr) IV Continuous <Continuous>  dextrose 5%. 1000 milliLiter(s) (50 mL/Hr) IV Continuous <Continuous>  dextrose 50% Injectable 25 Gram(s) IV Push once  dextrose 50% Injectable 12.5 Gram(s) IV Push once  dextrose 50% Injectable 25 Gram(s) IV Push once  folic acid 1 milliGRAM(s) Oral daily  glucagon  Injectable 1 milliGRAM(s) IntraMuscular once  insulin glargine Injectable (LANTUS) 12 Unit(s) SubCutaneous at bedtime  insulin lispro (ADMELOG) corrective regimen sliding scale   SubCutaneous three times a day before meals  insulin lispro Injectable (ADMELOG) 2 Unit(s) SubCutaneous three times a day before meals  lactated ringers. 1000 milliLiter(s) (75 mL/Hr) IV Continuous <Continuous>  multivitamin 1 Tablet(s) Oral daily  nystatin Ointment 1 Application(s) Topical two times a day  pantoprazole    Tablet 40 milliGRAM(s) Oral before breakfast  QUEtiapine 12.5 milliGRAM(s) Oral at bedtime  senna 2 Tablet(s) Oral at bedtime    MEDICATIONS  (PRN):  acetaminophen     Tablet .. 650 milliGRAM(s) Oral every 6 hours PRN Mild Pain (1 - 3)  ALPRAZolam 0.25 milliGRAM(s) Oral daily PRN agitation  dextrose Oral Gel 15 Gram(s) Oral once PRN Blood Glucose LESS THAN 70 milliGRAM(s)/deciliter  magnesium hydroxide Suspension 30 milliLiter(s) Oral daily PRN Constipation  morphine  - Injectable 2 milliGRAM(s) IV Push every 4 hours PRN Severe Pain (7 - 10)  oxycodone    5 mG/acetaminophen 325 mG 1 Tablet(s) Oral every 6 hours PRN Moderate Pain (4 - 6)    Allergies    No Known Allergies    Intolerances      REVIEW OF SYSTEMS:  All other systems reviewed and are negative    Vital Signs Last 24 Hrs  T(C): 36.7 (11 Jan 2023 11:15), Max: 37.1 (10 Rajinder 2023 23:25)  T(F): 98 (11 Jan 2023 11:15), Max: 98.7 (10 Rajinder 2023 23:25)  HR: 110 (11 Jan 2023 11:15) (86 - 114)  BP: 114/71 (11 Jan 2023 11:15) (114/69 - 129/78)  BP(mean): --  RR: 18 (11 Jan 2023 11:15) (17 - 18)  SpO2: 96% (11 Jan 2023 11:15) (94% - 99%)    Parameters below as of 11 Jan 2023 10:35  Patient On (Oxygen Delivery Method): room air      Daily     Daily   I&O's Summary    10 Rajinder 2023 07:01  -  11 Jan 2023 07:00  --------------------------------------------------------  IN: 0 mL / OUT: 400 mL / NET: -400 mL      CAPILLARY BLOOD GLUCOSE      POCT Blood Glucose.: 264 mg/dL (11 Jan 2023 11:33)  POCT Blood Glucose.: 214 mg/dL (11 Jan 2023 07:40)  POCT Blood Glucose.: 241 mg/dL (10 Rajinder 2023 22:00)  POCT Blood Glucose.: 312 mg/dL (10 Rajinder 2023 16:40)    PHYSICAL EXAM:    GENERAL: NAD,   no increased WOB,   HEAD:  Atraumatic, Normocephalic  EYES: EOMI, PERRLA, conjunctiva and sclera clear  ENMT: Moist mucous membranes  NECK: Supple, No JVD  NERVOUS SYSTEM:  Alert & Oriented X 2 confused at times , left sided facial droop and left sided weakness --appear old, speech is slightly slurred but overall coherent , follows commands , no issues with swallowing   CHEST/LUNG: Clear to auscultation bilaterally; No rales, rhonchi, wheezing, or rubs  HEART: Regular rate and rhythm; No murmurs, rubs, or gallops  ABDOMEN: Soft, Nontender, Nondistended; Bowel sounds present  EXTREMITIES:  no edema or calf tenderness b/l        Labs                          8.7    11.19 )-----------( 285      ( 11 Jan 2023 07:05 )             27.1     01-11    138  |  107  |  10  ----------------------------<  202<H>  3.9   |  23  |  0.73    Ca    9.5      11 Jan 2023 07:05  Phos  2.0     01-11  Mg     1.5     01-11                          DVT prophylaxis: > Lovenox 40mg SQ daily  > Heparin   > SCD's

## 2023-01-11 NOTE — OCCUPATIONAL THERAPY INITIAL EVALUATION ADULT - TRANSFER TRAINING, PT EVAL
Patient will be able to perform functional transfers, using least restrictive device, independently within 8-10 weeks.

## 2023-01-12 LAB
% ALBUMIN: 42.1 % — SIGNIFICANT CHANGE UP
% ALPHA 1: 13.6 % — SIGNIFICANT CHANGE UP
% ALPHA 2: 20.2 % — SIGNIFICANT CHANGE UP
% BETA: 14.7 % — SIGNIFICANT CHANGE UP
% GAMMA: 9.4 % — SIGNIFICANT CHANGE UP
ALBUMIN SERPL ELPH-MCNC: 1.5 G/DL — LOW (ref 3.3–5)
ALBUMIN SERPL ELPH-MCNC: 2 G/DL — LOW (ref 3.6–5.5)
ALBUMIN/GLOB SERPL ELPH: 0.7 RATIO — SIGNIFICANT CHANGE UP
ALP SERPL-CCNC: 115 U/L — SIGNIFICANT CHANGE UP (ref 40–120)
ALPHA1 GLOB SERPL ELPH-MCNC: 0.7 G/DL — HIGH (ref 0.1–0.4)
ALPHA2 GLOB SERPL ELPH-MCNC: 1 G/DL — SIGNIFICANT CHANGE UP (ref 0.5–1)
ALT FLD-CCNC: 10 U/L — LOW (ref 12–78)
ANION GAP SERPL CALC-SCNC: 9 MMOL/L — SIGNIFICANT CHANGE UP (ref 5–17)
AST SERPL-CCNC: 13 U/L — LOW (ref 15–37)
B-GLOBULIN SERPL ELPH-MCNC: 0.7 G/DL — SIGNIFICANT CHANGE UP (ref 0.5–1)
BILIRUB SERPL-MCNC: 0.3 MG/DL — SIGNIFICANT CHANGE UP (ref 0.2–1.2)
BUN SERPL-MCNC: 12 MG/DL — SIGNIFICANT CHANGE UP (ref 7–23)
CALCIUM SERPL-MCNC: 9.3 MG/DL — SIGNIFICANT CHANGE UP (ref 8.5–10.1)
CHLORIDE SERPL-SCNC: 105 MMOL/L — SIGNIFICANT CHANGE UP (ref 96–108)
CO2 SERPL-SCNC: 22 MMOL/L — SIGNIFICANT CHANGE UP (ref 22–31)
CREAT SERPL-MCNC: 0.78 MG/DL — SIGNIFICANT CHANGE UP (ref 0.5–1.3)
EGFR: 80 ML/MIN/1.73M2 — SIGNIFICANT CHANGE UP
GAMMA GLOBULIN: 0.5 G/DL — LOW (ref 0.6–1.6)
GLUCOSE BLDC GLUCOMTR-MCNC: 251 MG/DL — HIGH (ref 70–99)
GLUCOSE BLDC GLUCOMTR-MCNC: 315 MG/DL — HIGH (ref 70–99)
GLUCOSE BLDC GLUCOMTR-MCNC: 370 MG/DL — HIGH (ref 70–99)
GLUCOSE BLDC GLUCOMTR-MCNC: 384 MG/DL — HIGH (ref 70–99)
GLUCOSE SERPL-MCNC: 231 MG/DL — HIGH (ref 70–99)
HCT VFR BLD CALC: 26.3 % — LOW (ref 34.5–45)
HGB BLD-MCNC: 8.4 G/DL — LOW (ref 11.5–15.5)
IGA FLD-MCNC: 122 MG/DL — SIGNIFICANT CHANGE UP (ref 84–499)
IGG FLD-MCNC: 502 MG/DL — LOW (ref 610–1660)
IGM SERPL-MCNC: 58 MG/DL — SIGNIFICANT CHANGE UP (ref 35–242)
INTERPRETATION SERPL IFE-IMP: SIGNIFICANT CHANGE UP
KAPPA LC SER QL IFE: 2.69 MG/DL — HIGH (ref 0.33–1.94)
KAPPA/LAMBDA FREE LIGHT CHAIN RATIO, SERUM: 1.21 RATIO — SIGNIFICANT CHANGE UP (ref 0.26–1.65)
LAMBDA LC SER QL IFE: 2.23 MG/DL — SIGNIFICANT CHANGE UP (ref 0.57–2.63)
MAGNESIUM SERPL-MCNC: 2.1 MG/DL — SIGNIFICANT CHANGE UP (ref 1.6–2.6)
MCHC RBC-ENTMCNC: 25.2 PG — LOW (ref 27–34)
MCHC RBC-ENTMCNC: 31.9 G/DL — LOW (ref 32–36)
MCV RBC AUTO: 79 FL — LOW (ref 80–100)
NRBC # BLD: 0 /100 WBCS — SIGNIFICANT CHANGE UP (ref 0–0)
PHOSPHATE SERPL-MCNC: 1.8 MG/DL — LOW (ref 2.5–4.5)
PLATELET # BLD AUTO: 253 K/UL — SIGNIFICANT CHANGE UP (ref 150–400)
POTASSIUM SERPL-MCNC: 3.7 MMOL/L — SIGNIFICANT CHANGE UP (ref 3.5–5.3)
POTASSIUM SERPL-SCNC: 3.7 MMOL/L — SIGNIFICANT CHANGE UP (ref 3.5–5.3)
PROT PATTERN SERPL ELPH-IMP: SIGNIFICANT CHANGE UP
PROT SERPL-MCNC: 5.1 GM/DL — LOW (ref 6–8.3)
RBC # BLD: 3.33 M/UL — LOW (ref 3.8–5.2)
RBC # FLD: 15.2 % — HIGH (ref 10.3–14.5)
SODIUM SERPL-SCNC: 136 MMOL/L — SIGNIFICANT CHANGE UP (ref 135–145)
WBC # BLD: 10.12 K/UL — SIGNIFICANT CHANGE UP (ref 3.8–10.5)
WBC # FLD AUTO: 10.12 K/UL — SIGNIFICANT CHANGE UP (ref 3.8–10.5)

## 2023-01-12 PROCEDURE — 99232 SBSQ HOSP IP/OBS MODERATE 35: CPT

## 2023-01-12 RX ADMIN — Medication 1 MILLIGRAM(S): at 12:11

## 2023-01-12 RX ADMIN — Medication 2000 UNIT(S): at 12:11

## 2023-01-12 RX ADMIN — Medication 1 TABLET(S): at 12:12

## 2023-01-12 RX ADMIN — Medication 100 MILLIGRAM(S): at 14:49

## 2023-01-12 RX ADMIN — Medication 81 MILLIGRAM(S): at 12:11

## 2023-01-12 RX ADMIN — Medication 5: at 17:38

## 2023-01-12 RX ADMIN — QUETIAPINE FUMARATE 12.5 MILLIGRAM(S): 200 TABLET, FILM COATED ORAL at 21:50

## 2023-01-12 RX ADMIN — ATORVASTATIN CALCIUM 40 MILLIGRAM(S): 80 TABLET, FILM COATED ORAL at 21:50

## 2023-01-12 RX ADMIN — Medication 2 UNIT(S): at 12:13

## 2023-01-12 RX ADMIN — Medication 100 MILLIGRAM(S): at 21:49

## 2023-01-12 RX ADMIN — PANTOPRAZOLE SODIUM 40 MILLIGRAM(S): 20 TABLET, DELAYED RELEASE ORAL at 09:10

## 2023-01-12 RX ADMIN — APIXABAN 10 MILLIGRAM(S): 2.5 TABLET, FILM COATED ORAL at 17:37

## 2023-01-12 RX ADMIN — Medication 100 MILLIGRAM(S): at 05:12

## 2023-01-12 RX ADMIN — Medication 2 UNIT(S): at 17:38

## 2023-01-12 RX ADMIN — NYSTATIN CREAM 1 APPLICATION(S): 100000 CREAM TOPICAL at 05:12

## 2023-01-12 RX ADMIN — SENNA PLUS 2 TABLET(S): 8.6 TABLET ORAL at 21:51

## 2023-01-12 RX ADMIN — Medication 2 UNIT(S): at 09:10

## 2023-01-12 RX ADMIN — APIXABAN 10 MILLIGRAM(S): 2.5 TABLET, FILM COATED ORAL at 05:13

## 2023-01-12 RX ADMIN — NYSTATIN CREAM 1 APPLICATION(S): 100000 CREAM TOPICAL at 17:39

## 2023-01-12 RX ADMIN — MORPHINE SULFATE 2 MILLIGRAM(S): 50 CAPSULE, EXTENDED RELEASE ORAL at 05:13

## 2023-01-12 RX ADMIN — Medication 4: at 12:12

## 2023-01-12 RX ADMIN — Medication 650 MILLIGRAM(S): at 12:11

## 2023-01-12 RX ADMIN — INSULIN GLARGINE 12 UNIT(S): 100 INJECTION, SOLUTION SUBCUTANEOUS at 21:50

## 2023-01-12 RX ADMIN — PREGABALIN 1000 MICROGRAM(S): 225 CAPSULE ORAL at 12:12

## 2023-01-12 RX ADMIN — Medication 3: at 09:09

## 2023-01-12 NOTE — PROGRESS NOTE ADULT - SUBJECTIVE AND OBJECTIVE BOX
JOSE BECKER  MRN-[id number]    Follow Up:  skin changes, excoriations, concern for cellulitis     Interval History: the pt was seen and examined earlier, no distress, states that she is feeling better.     PAST MEDICAL & SURGICAL HISTORY:  Hypertension      Diabetes mellitus      S/P hip replacement, left          ROS:  limited pt is somewhat confused, still has some discomfort due to skin irritation   [ ] Unobtainable because:  [ ] All other systems negative    Constitutional: no fever, no chills  Head: no trauma  Eyes: no vision changes, no eye pain  ENT:  no sore throat, no rhinorrhea  Cardiovascular:  no chest pain, no palpitation  Respiratory:  no SOB, no cough  GI:  no abd pain, no vomiting, no diarrhea  urinary: no dysuria, no hematuria, no flank pain  musculoskeletal:  no joint pain, no joint swelling  skin:  no rash  neurology:  no headache, no seizure, no change in mental status  psych: no anxiety, no depression         Allergies  No Known Allergies        ANTIMICROBIALS:  ceFAZolin   IVPB    ceFAZolin   IVPB 1000 every 8 hours      OTHER MEDS:  acetaminophen     Tablet .. 650 milliGRAM(s) Oral every 6 hours PRN  ALPRAZolam 0.25 milliGRAM(s) Oral daily PRN  apixaban 10 milliGRAM(s) Oral every 12 hours  aspirin  chewable 81 milliGRAM(s) Oral daily  atorvastatin 40 milliGRAM(s) Oral at bedtime  cholecalciferol 2000 Unit(s) Oral daily  cyanocobalamin 1000 MICROGram(s) Oral daily  dextrose 5%. 1000 milliLiter(s) IV Continuous <Continuous>  dextrose 5%. 1000 milliLiter(s) IV Continuous <Continuous>  dextrose 50% Injectable 25 Gram(s) IV Push once  dextrose 50% Injectable 12.5 Gram(s) IV Push once  dextrose 50% Injectable 25 Gram(s) IV Push once  dextrose Oral Gel 15 Gram(s) Oral once PRN  folic acid 1 milliGRAM(s) Oral daily  glucagon  Injectable 1 milliGRAM(s) IntraMuscular once  insulin glargine Injectable (LANTUS) 12 Unit(s) SubCutaneous at bedtime  insulin lispro (ADMELOG) corrective regimen sliding scale   SubCutaneous three times a day before meals  insulin lispro Injectable (ADMELOG) 2 Unit(s) SubCutaneous three times a day before meals  lactated ringers. 1000 milliLiter(s) IV Continuous <Continuous>  magnesium hydroxide Suspension 30 milliLiter(s) Oral daily PRN  morphine  - Injectable 2 milliGRAM(s) IV Push every 4 hours PRN  multivitamin 1 Tablet(s) Oral daily  nystatin Ointment 1 Application(s) Topical two times a day  oxycodone    5 mG/acetaminophen 325 mG 1 Tablet(s) Oral every 6 hours PRN  pantoprazole    Tablet 40 milliGRAM(s) Oral before breakfast  QUEtiapine 12.5 milliGRAM(s) Oral at bedtime  senna 2 Tablet(s) Oral at bedtime      Vital Signs Last 24 Hrs  T(C): 37.2 (12 Jan 2023 16:55), Max: 37.2 (11 Jan 2023 23:15)  T(F): 98.9 (12 Jan 2023 16:55), Max: 98.9 (11 Jan 2023 23:15)  HR: 97 (12 Jan 2023 16:55) (94 - 98)  BP: 129/79 (12 Jan 2023 16:55) (129/79 - 146/75)  BP(mean): --  RR: 18 (12 Jan 2023 16:55) (17 - 18)  SpO2: 95% (12 Jan 2023 16:55) (95% - 97%)    Parameters below as of 12 Jan 2023 16:55  Patient On (Oxygen Delivery Method): room air        Physical Exam:  General:    NAD,  non toxic, disheveled  Head: atraumatic, normocephalic, + Hirsutism  Eye: normal sclera and conjunctiva, anicteric, no conjunctival injection  ENT:    no oral lesions, missing teeth, poor dentition overall, neck supple  Cardio:    tachycardic S1, S2,  no murmur  Respiratory:    clear b/l,    no wheezing, no rhonchi, no rales   abd:     soft,   BS +,   no tenderness, obese, no guarding, no palpable mass   :   no CVAT,  no suprapubic tenderness,   no  morrow  Musculoskeletal:   no joint swelling,   no edema, developing contractures b/l LEs   vascular: no central lines, +PIV   Skin:  skin excoriations - improving    Neurologic:  awake and alert, answers questions, follows commands  psych: somewhat anxious     WBC Count: 10.12 K/uL (01-12 @ 05:55)  WBC Count: 11.19 K/uL (01-11 @ 07:05)  WBC Count: 11.14 K/uL (01-09 @ 06:45)  WBC Count: 12.87 K/uL (01-08 @ 08:00)  WBC Count: 11.64 K/uL (01-07 @ 07:40)  WBC Count: 13.82 K/uL (01-06 @ 08:20)                            8.4    10.12 )-----------( 253      ( 12 Jan 2023 05:55 )             26.3       01-12    136  |  105  |  12  ----------------------------<  231<H>  3.7   |  22  |  0.78    Ca    9.3      12 Jan 2023 05:55  Phos  1.8     01-12  Mg     2.1     01-12    TPro  5.1<L>  /  Alb  1.5<L>  /  TBili  0.3  /  DBili  x   /  AST  13<L>  /  ALT  10<L>  /  AlkPhos  115  01-12          Creatinine Trend: 0.78<--, 0.73<--, 0.77<--, 0.88<--, 0.99<--, 1.05<--      MICROBIOLOGY:  v  Clean Catch Clean Catch (Midstream)  01-05-23   No growth  --  --      .Blood Blood-Peripheral  01-04-23   No Growth Final  --  --      .Blood Blood-Peripheral  01-04-23   No Growth Final  --  --      Ferritin, Serum: 589 (01-05)      D-Dimer Assay, Quantitative: 828 (01-07)          RADIOLOGY:

## 2023-01-12 NOTE — PROGRESS NOTE ADULT - ASSESSMENT
72 F with hx of HTN, non-IDMMT2, presents from home for inability to care for herself via Adult Protective Services.   disheveled, refused most of exam    leukocytosis  CXR poor quality due ot patient position   CT head ok, no bleed  rash could not be examined due to patient compliance though said can be examined tomorrow     1/6: no fevers, tachycardic, RA, WBC better 13.82, Cr ok, BCs and UC with no growth to date. Due to pt's immobility, the pt has developed severe skin excoriation, raw looking skin - large portion of pt's back, left side of pt's abdomen, flank and part of her thigh, painful, not infected looking. The pt will need excellent skin care and timely turning and position. Please take into consideration that when the pt is ready for discharge, the pt will not be able to care for herself.   Attending addendum:  agree with above low suspicion for infection at this time  needs excellent skin care   pain control   social work  involvement  1/12: no fevers, RA, no wbc, BCs with no growth to date, skin irritation, erythema is improving - much better today, does not seem to be infected, would stop antibiotics.     Plan:  follow blood and urine cultures - NGTD  would stop antibiotics   continue antifungal as per medicine  consider Dermatology evaluation   excellent skin care  social work     will sign off, re-consult as needed    Discussed with Dr. Sequeira

## 2023-01-12 NOTE — PROGRESS NOTE ADULT - SUBJECTIVE AND OBJECTIVE BOX
Patient is a 72y old  Female who presents with a chief complaint of Failure to thrive (11 Jan 2023 12:34)    INTERVAL HPI/OVERNIGHT EVENTS: no events     MEDICATIONS  (STANDING):  apixaban 10 milliGRAM(s) Oral every 12 hours  aspirin  chewable 81 milliGRAM(s) Oral daily  atorvastatin 40 milliGRAM(s) Oral at bedtime  ceFAZolin   IVPB      ceFAZolin   IVPB 1000 milliGRAM(s) IV Intermittent every 8 hours  cholecalciferol 2000 Unit(s) Oral daily  cyanocobalamin 1000 MICROGram(s) Oral daily  dextrose 5%. 1000 milliLiter(s) (100 mL/Hr) IV Continuous <Continuous>  dextrose 5%. 1000 milliLiter(s) (50 mL/Hr) IV Continuous <Continuous>  dextrose 50% Injectable 25 Gram(s) IV Push once  dextrose 50% Injectable 12.5 Gram(s) IV Push once  dextrose 50% Injectable 25 Gram(s) IV Push once  folic acid 1 milliGRAM(s) Oral daily  glucagon  Injectable 1 milliGRAM(s) IntraMuscular once  insulin glargine Injectable (LANTUS) 12 Unit(s) SubCutaneous at bedtime  insulin lispro (ADMELOG) corrective regimen sliding scale   SubCutaneous three times a day before meals  insulin lispro Injectable (ADMELOG) 2 Unit(s) SubCutaneous three times a day before meals  lactated ringers. 1000 milliLiter(s) (75 mL/Hr) IV Continuous <Continuous>  multivitamin 1 Tablet(s) Oral daily  nystatin Ointment 1 Application(s) Topical two times a day  pantoprazole    Tablet 40 milliGRAM(s) Oral before breakfast  QUEtiapine 12.5 milliGRAM(s) Oral at bedtime  senna 2 Tablet(s) Oral at bedtime    MEDICATIONS  (PRN):  acetaminophen     Tablet .. 650 milliGRAM(s) Oral every 6 hours PRN Mild Pain (1 - 3)  ALPRAZolam 0.25 milliGRAM(s) Oral daily PRN agitation  dextrose Oral Gel 15 Gram(s) Oral once PRN Blood Glucose LESS THAN 70 milliGRAM(s)/deciliter  magnesium hydroxide Suspension 30 milliLiter(s) Oral daily PRN Constipation  morphine  - Injectable 2 milliGRAM(s) IV Push every 4 hours PRN Severe Pain (7 - 10)  oxycodone    5 mG/acetaminophen 325 mG 1 Tablet(s) Oral every 6 hours PRN Moderate Pain (4 - 6)    Allergies    No Known Allergies    Intolerances      REVIEW OF SYSTEMS:  All other systems reviewed and are negative    Vital Signs Last 24 Hrs  T(C): 36.8 (12 Jan 2023 05:00), Max: 37.8 (11 Jan 2023 17:15)  T(F): 98.3 (12 Jan 2023 05:00), Max: 100.1 (11 Jan 2023 17:15)  HR: 98 (12 Jan 2023 05:00) (98 - 110)  BP: 132/81 (12 Jan 2023 05:00) (114/71 - 136/83)  BP(mean): --  RR: 17 (12 Jan 2023 05:00) (17 - 18)  SpO2: 96% (12 Jan 2023 05:00) (95% - 96%)      Daily     Daily   I&O's Summary    11 Jan 2023 07:01  -  12 Jan 2023 07:00  --------------------------------------------------------  IN: 0 mL / OUT: 300 mL / NET: -300 mL      CAPILLARY BLOOD GLUCOSE      POCT Blood Glucose.: 251 mg/dL (12 Jan 2023 08:13)  POCT Blood Glucose.: 316 mg/dL (11 Jan 2023 21:06)  POCT Blood Glucose.: 333 mg/dL (11 Jan 2023 16:32)  POCT Blood Glucose.: 264 mg/dL (11 Jan 2023 11:33)    PHYSICAL EXAM:  GENERAL: NAD,   no increased WOB,   HEAD:  Atraumatic, Normocephalic  EYES: EOMI, PERRLA, conjunctiva and sclera clear  ENMT: Moist mucous membranes  NECK: Supple, No JVD  NERVOUS SYSTEM:  Alert & Oriented X 2 confused at times , left sided facial droop and left sided weakness --appear old, speech is slightly slurred but overall coherent , follows commands , no issues with swallowing   CHEST/LUNG: Clear to auscultation bilaterally; No rales, rhonchi, wheezing, or rubs  HEART: Regular rate and rhythm; No murmurs, rubs, or gallops  ABDOMEN: Soft, Nontender, Nondistended; Bowel sounds present  EXTREMITIES:  no edema or calf tenderness b/l   Labs                          8.4    10.12 )-----------( 253      ( 12 Jan 2023 05:55 )             26.3     01-12    136  |  105  |  12  ----------------------------<  231<H>  3.7   |  22  |  0.78    Ca    9.3      12 Jan 2023 05:55  Phos  1.8     01-12  Mg     2.1     01-12    TPro  5.1<L>  /  Alb  1.5<L>  /  TBili  0.3  /  DBili  x   /  AST  13<L>  /  ALT  10<L>  /  AlkPhos  115  01-12                        DVT prophylaxis: > Lovenox 40mg SQ daily  > Heparin   > SCD's

## 2023-01-12 NOTE — PROGRESS NOTE ADULT - ASSESSMENT
72 F with hx of HTN, non-IDMMT2, presents from home for inability to care for herself via Adult Protective Services.     1/7/23 elevated d-dimer,  LE duplex showed Left soleal vein deep vein thrombosis. Acute deep venous thrombosis: below the knee.   eliquis was started   1/8/23 tachycardia most likely d/t agitation and pain ; refusing meds and care at times, most likely some component of delirium and pain     possibly fungal infection /pressure injury of back d/t prolonged sitting in her feces/urine   --wound care appreciated   --nystatin ointment to affected areas (nystatin powder does not stick to affected wounds)  --if able , will attempt dermatology eval although difficult in this hospital as dermatology specialty is not available. This is not a derm urgency or emergency and will continue to monitor progress   --ID following   1/8/23 added ancef to cover for superimposed cellulitis of her back wounds x 5 days   discussed with RN, please leave affected area to air dry after nystatin ointment       Self neglect.   ·  Plan: Found with feces, cockroaches, has been unable to get up from her couch x months.   Was trying to care for herself with help of neighbor.   - SW consult to f/up if open APS case  - Wound care consult for rashes + likely pressure ulcer on back  - Cousin Munira left 3rd number to contact in case of emergency,  [place] [phone] along with  or .   - [place] neighbor number [phone].    #H/O CVA (cerebrovascular accident) 2002 with left sided deficits   CT head: no acute findings   ECHO  continue with ASA, statin     history of Left hip replacement     L hip x-ray --without findings   follow knee xrays       AOCD  ·  Plan: Unclear baseline, may be 2/2 CKD  no e/o bleeding or bruising   H/H stable     Hypertension.  BP OK off meds      Diabetes mellitus.   A1c 7.6%   ISS, lantus   FS goal 140-180     Has elevated TSH to 4, check T4 however within goal for elderly.    hypercalcemia --follow UPEP/SPEP    Vit D deficiency --supplement   hypomagnesemia replace    DNR/DNI   fall, aspiration precautions, HOBE     MARAL placement

## 2023-01-13 LAB
GLUCOSE BLDC GLUCOMTR-MCNC: 206 MG/DL — HIGH (ref 70–99)
GLUCOSE BLDC GLUCOMTR-MCNC: 237 MG/DL — HIGH (ref 70–99)
GLUCOSE BLDC GLUCOMTR-MCNC: 256 MG/DL — HIGH (ref 70–99)
GLUCOSE BLDC GLUCOMTR-MCNC: 303 MG/DL — HIGH (ref 70–99)

## 2023-01-13 PROCEDURE — 99239 HOSP IP/OBS DSCHRG MGMT >30: CPT

## 2023-01-13 RX ORDER — ATORVASTATIN CALCIUM 80 MG/1
1 TABLET, FILM COATED ORAL
Qty: 0 | Refills: 0 | DISCHARGE
Start: 2023-01-13

## 2023-01-13 RX ORDER — CHOLECALCIFEROL (VITAMIN D3) 125 MCG
2000 CAPSULE ORAL
Qty: 0 | Refills: 0 | DISCHARGE
Start: 2023-01-13

## 2023-01-13 RX ORDER — PANTOPRAZOLE SODIUM 20 MG/1
1 TABLET, DELAYED RELEASE ORAL
Qty: 0 | Refills: 0 | DISCHARGE
Start: 2023-01-13

## 2023-01-13 RX ORDER — ASPIRIN/CALCIUM CARB/MAGNESIUM 324 MG
1 TABLET ORAL
Qty: 0 | Refills: 0 | DISCHARGE
Start: 2023-01-13

## 2023-01-13 RX ORDER — INSULIN GLARGINE 100 [IU]/ML
12 INJECTION, SOLUTION SUBCUTANEOUS
Qty: 0 | Refills: 0 | DISCHARGE
Start: 2023-01-13

## 2023-01-13 RX ORDER — NYSTATIN CREAM 100000 [USP'U]/G
1 CREAM TOPICAL
Qty: 0 | Refills: 0 | DISCHARGE
Start: 2023-01-13

## 2023-01-13 RX ORDER — METFORMIN HYDROCHLORIDE 850 MG/1
1 TABLET ORAL
Qty: 0 | Refills: 0 | DISCHARGE

## 2023-01-13 RX ORDER — APIXABAN 2.5 MG/1
1 TABLET, FILM COATED ORAL
Qty: 0 | Refills: 0 | DISCHARGE
Start: 2023-01-13

## 2023-01-13 RX ORDER — SENNA PLUS 8.6 MG/1
2 TABLET ORAL
Qty: 0 | Refills: 0 | DISCHARGE
Start: 2023-01-13

## 2023-01-13 RX ORDER — PREGABALIN 225 MG/1
1 CAPSULE ORAL
Qty: 0 | Refills: 0 | DISCHARGE
Start: 2023-01-13

## 2023-01-13 RX ORDER — FUROSEMIDE 40 MG
1 TABLET ORAL
Qty: 0 | Refills: 0 | DISCHARGE

## 2023-01-13 RX ORDER — APIXABAN 2.5 MG/1
2 TABLET, FILM COATED ORAL
Qty: 0 | Refills: 0 | DISCHARGE
Start: 2023-01-13

## 2023-01-13 RX ORDER — QUETIAPINE FUMARATE 200 MG/1
1 TABLET, FILM COATED ORAL
Qty: 0 | Refills: 0 | DISCHARGE

## 2023-01-13 RX ORDER — ALPRAZOLAM 0.25 MG
1 TABLET ORAL
Qty: 0 | Refills: 0 | DISCHARGE
Start: 2023-01-13

## 2023-01-13 RX ADMIN — Medication 1 MILLIGRAM(S): at 12:39

## 2023-01-13 RX ADMIN — QUETIAPINE FUMARATE 12.5 MILLIGRAM(S): 200 TABLET, FILM COATED ORAL at 22:05

## 2023-01-13 RX ADMIN — Medication 2 UNIT(S): at 17:05

## 2023-01-13 RX ADMIN — Medication 100 MILLIGRAM(S): at 05:32

## 2023-01-13 RX ADMIN — Medication 100 MILLIGRAM(S): at 15:36

## 2023-01-13 RX ADMIN — ATORVASTATIN CALCIUM 40 MILLIGRAM(S): 80 TABLET, FILM COATED ORAL at 22:06

## 2023-01-13 RX ADMIN — PANTOPRAZOLE SODIUM 40 MILLIGRAM(S): 20 TABLET, DELAYED RELEASE ORAL at 09:05

## 2023-01-13 RX ADMIN — Medication 4: at 17:04

## 2023-01-13 RX ADMIN — SENNA PLUS 2 TABLET(S): 8.6 TABLET ORAL at 22:05

## 2023-01-13 RX ADMIN — Medication 2000 UNIT(S): at 12:39

## 2023-01-13 RX ADMIN — Medication 2: at 09:04

## 2023-01-13 RX ADMIN — Medication 81 MILLIGRAM(S): at 12:39

## 2023-01-13 RX ADMIN — NYSTATIN CREAM 1 APPLICATION(S): 100000 CREAM TOPICAL at 05:32

## 2023-01-13 RX ADMIN — Medication 1 TABLET(S): at 12:39

## 2023-01-13 RX ADMIN — INSULIN GLARGINE 12 UNIT(S): 100 INJECTION, SOLUTION SUBCUTANEOUS at 22:06

## 2023-01-13 RX ADMIN — Medication 2 UNIT(S): at 09:04

## 2023-01-13 RX ADMIN — PREGABALIN 1000 MICROGRAM(S): 225 CAPSULE ORAL at 12:39

## 2023-01-13 RX ADMIN — Medication 2 UNIT(S): at 12:29

## 2023-01-13 RX ADMIN — APIXABAN 10 MILLIGRAM(S): 2.5 TABLET, FILM COATED ORAL at 05:33

## 2023-01-13 NOTE — DISCHARGE NOTE PROVIDER - NSDCCPCAREPLAN_GEN_ALL_CORE_FT
PRINCIPAL DISCHARGE DIAGNOSIS  Diagnosis: Adult failure to thrive  Assessment and Plan of Treatment: rehab facility  continue all meds as prescribed follow w your primary doctor

## 2023-01-13 NOTE — DISCHARGE NOTE PROVIDER - HOSPITAL COURSE
72 F with hx of HTN, non-IDMMT2, presents from home for inability to care for herself via Adult Protective Services.     1/7/23 elevated d-dimer,  LE duplex showed Left soleal vein deep vein thrombosis. Acute deep venous thrombosis: below the knee.   eliquis was started   1/8/23 tachycardia most likely d/t agitation and pain ; refusing meds and care at times, most likely some component of delirium and pain     possibly fungal infection /pressure injury of back d/t prolonged sitting in her feces/urine   --wound care appreciated   --nystatin ointment to affected areas (nystatin powder does not stick to affected wounds)  --if able , will attempt dermatology eval although difficult in this hospital as dermatology specialty is not available. This is not a derm urgency or emergency and will continue to monitor progress   --ID following   1/8/23 added ancef to cover for superimposed cellulitis of her back wounds x 5 days   discussed with RN, please leave affected area to air dry after nystatin ointment     completed antibiotics, c/w skincare ,    DVT   c/w eliquis     Self neglect.   ·  Plan: Found with feces, cockroaches, has been unable to get up from her couch x months.   Was trying to care for herself with help of neighbor.   - SW consult to f/up if open APS case  - Wound care consult for rashes + likely pressure ulcer on back  - Cousin Munira left 3rd number to contact in case of emergency,  [place] [phone] along with  or .   - [place] neighbor number [phone].    #H/O CVA (cerebrovascular accident) 2002 with left sided deficits   CT head: no acute findings   ECHO  continue with ASA, statin     history of Left hip replacement     L hip x-ray --without findings        AOCD  ·  Plan: Unclear baseline, may be 2/2 CKD  no e/o bleeding or bruising   H/H stable     Hypertension.  BP OK off meds      Diabetes mellitus.   A1c 7.6%   ISS, lantus   FS goal 140-180     Has elevated TSH to 4, check T4 however within goal for elderly.    hypercalcemia - resolved w hydration     Vit D deficiency --supplement   hypomagnesemia replace    DNR/DNI   fall, aspiration precautions, HOBE         pt seen and examined 45 min spent on dc planning     Lab test review, Radiology Review, Vitals review, Consultant review and discussion, Physical examination, IDR, Assessment and plan; Plan discussion with patient and family

## 2023-01-13 NOTE — DISCHARGE NOTE PROVIDER - NSDCMRMEDTOKEN_GEN_ALL_CORE_FT
ALPRAZolam 0.25 mg oral tablet: 1 tab(s) orally once a day, As needed, agitation  apixaban 5 mg oral tablet: 1 tab(s) orally every 12 hours  aspirin 81 mg oral tablet, chewable: 1 tab(s) orally once a day  atorvastatin 40 mg oral tablet: 1 tab(s) orally once a day (at bedtime)  cholecalciferol oral tablet: 2000 unit(s) orally once a day  cyanocobalamin 1000 mcg oral tablet: 1 tab(s) orally once a day  insulin glargine 100 units/mL subcutaneous solution: 12 unit(s) subcutaneous once a day (at bedtime)  Multiple Vitamins oral tablet: 1 tab(s) orally once a day  nystatin 100,000 units/g topical ointment: 1 application topically 2 times a day  pantoprazole 40 mg oral delayed release tablet: 1 tab(s) orally once a day (before a meal)  senna leaf extract oral tablet: 2 tab(s) orally once a day (at bedtime)  SEROquel 25 mg oral tablet: 1 tab(s) orally once a day (at bedtime)

## 2023-01-14 LAB
GLUCOSE BLDC GLUCOMTR-MCNC: 193 MG/DL — HIGH (ref 70–99)
GLUCOSE BLDC GLUCOMTR-MCNC: 197 MG/DL — HIGH (ref 70–99)
GLUCOSE BLDC GLUCOMTR-MCNC: 221 MG/DL — HIGH (ref 70–99)
GLUCOSE BLDC GLUCOMTR-MCNC: 295 MG/DL — HIGH (ref 70–99)

## 2023-01-14 PROCEDURE — 93306 TTE W/DOPPLER COMPLETE: CPT | Mod: 26

## 2023-01-14 PROCEDURE — 99232 SBSQ HOSP IP/OBS MODERATE 35: CPT

## 2023-01-14 RX ORDER — APIXABAN 2.5 MG/1
5 TABLET, FILM COATED ORAL EVERY 12 HOURS
Refills: 0 | Status: DISCONTINUED | OUTPATIENT
Start: 2023-01-14 | End: 2023-01-17

## 2023-01-14 RX ADMIN — Medication 2 UNIT(S): at 16:48

## 2023-01-14 RX ADMIN — PREGABALIN 1000 MICROGRAM(S): 225 CAPSULE ORAL at 11:39

## 2023-01-14 RX ADMIN — NYSTATIN CREAM 1 APPLICATION(S): 100000 CREAM TOPICAL at 05:35

## 2023-01-14 RX ADMIN — Medication 2000 UNIT(S): at 11:39

## 2023-01-14 RX ADMIN — APIXABAN 10 MILLIGRAM(S): 2.5 TABLET, FILM COATED ORAL at 05:38

## 2023-01-14 RX ADMIN — Medication 2 UNIT(S): at 08:19

## 2023-01-14 RX ADMIN — Medication 1 MILLIGRAM(S): at 11:41

## 2023-01-14 RX ADMIN — Medication 2 UNIT(S): at 11:41

## 2023-01-14 RX ADMIN — INSULIN GLARGINE 12 UNIT(S): 100 INJECTION, SOLUTION SUBCUTANEOUS at 21:48

## 2023-01-14 RX ADMIN — Medication 1: at 16:47

## 2023-01-14 RX ADMIN — Medication 2: at 11:41

## 2023-01-14 RX ADMIN — SENNA PLUS 2 TABLET(S): 8.6 TABLET ORAL at 21:48

## 2023-01-14 RX ADMIN — ATORVASTATIN CALCIUM 40 MILLIGRAM(S): 80 TABLET, FILM COATED ORAL at 21:45

## 2023-01-14 RX ADMIN — PANTOPRAZOLE SODIUM 40 MILLIGRAM(S): 20 TABLET, DELAYED RELEASE ORAL at 08:20

## 2023-01-14 RX ADMIN — Medication 1 TABLET(S): at 11:40

## 2023-01-14 RX ADMIN — Medication 1: at 08:18

## 2023-01-14 RX ADMIN — NYSTATIN CREAM 1 APPLICATION(S): 100000 CREAM TOPICAL at 18:35

## 2023-01-14 RX ADMIN — QUETIAPINE FUMARATE 12.5 MILLIGRAM(S): 200 TABLET, FILM COATED ORAL at 21:45

## 2023-01-14 RX ADMIN — Medication 81 MILLIGRAM(S): at 11:40

## 2023-01-14 RX ADMIN — APIXABAN 5 MILLIGRAM(S): 2.5 TABLET, FILM COATED ORAL at 18:35

## 2023-01-14 NOTE — PROGRESS NOTE ADULT - ASSESSMENT
72 F with hx of HTN, non-IDMMT2, presents from home for inability to care for herself via Adult Protective Services.     1/7/23 elevated d-dimer,  LE duplex showed Left soleal vein deep vein thrombosis. Acute deep venous thrombosis: below the knee.   eliquis was started   1/8/23 tachycardia most likely d/t agitation and pain ; refusing meds and care at times, most likely some component of delirium and pain     possibly fungal infection /pressure injury of back d/t prolonged sitting in her feces/urine   --wound care appreciated   --nystatin ointment to affected areas (nystatin powder does not stick to affected wounds)  --if able , will attempt dermatology eval although difficult in this hospital as dermatology specialty is not available. This is not a derm urgency or emergency and will continue to monitor progress   --ID following   1/8/23 added ancef to cover for superimposed cellulitis of her back wounds x 5 days   discussed with RN, please leave affected area to air dry after nystatin ointment     completed antibiotics, c/w skincare ,    DVT   c/w eliquis     Self neglect.   ·  Plan: Found with feces, cockroaches, has been unable to get up from her couch x months.   Was trying to care for herself with help of neighbor.   - SW consult to f/up if open APS case  - Wound care consult for rashes + likely pressure ulcer on back  - Cousin Munira left 3rd number to contact in case of emergency,  [place] [phone] along with  or .   - [place] neighbor number [phone].    #H/O CVA (cerebrovascular accident) 2002 with left sided deficits   CT head: no acute findings   ECHO  continue with ASA, statin     history of Left hip replacement     L hip x-ray --without findings        AOCD  ·  Plan: Unclear baseline, may be 2/2 CKD  no e/o bleeding or bruising   H/H stable     Hypertension.  BP OK off meds      Diabetes mellitus.   A1c 7.6%   ISS, lantus   FS goal 140-180     Has elevated TSH to 4, check T4 however within goal for elderly.    hypercalcemia - resolved w hydration     Vit D deficiency --supplement   hypomagnesemia replace    DNR/DNI   fall, aspiration precautions, HOBE

## 2023-01-14 NOTE — PROGRESS NOTE ADULT - SUBJECTIVE AND OBJECTIVE BOX
Patient is a 72y old  Female who presents with a chief complaint of Failure to thrive (13 Jan 2023 10:59)    INTERVAL HPI/OVERNIGHT EVENTS:    MEDICATIONS  (STANDING):  aspirin  chewable 81 milliGRAM(s) Oral daily  atorvastatin 40 milliGRAM(s) Oral at bedtime  cholecalciferol 2000 Unit(s) Oral daily  cyanocobalamin 1000 MICROGram(s) Oral daily  dextrose 5%. 1000 milliLiter(s) (100 mL/Hr) IV Continuous <Continuous>  dextrose 5%. 1000 milliLiter(s) (50 mL/Hr) IV Continuous <Continuous>  dextrose 50% Injectable 25 Gram(s) IV Push once  dextrose 50% Injectable 12.5 Gram(s) IV Push once  dextrose 50% Injectable 25 Gram(s) IV Push once  folic acid 1 milliGRAM(s) Oral daily  glucagon  Injectable 1 milliGRAM(s) IntraMuscular once  insulin glargine Injectable (LANTUS) 12 Unit(s) SubCutaneous at bedtime  insulin lispro (ADMELOG) corrective regimen sliding scale   SubCutaneous three times a day before meals  insulin lispro Injectable (ADMELOG) 2 Unit(s) SubCutaneous three times a day before meals  lactated ringers. 1000 milliLiter(s) (75 mL/Hr) IV Continuous <Continuous>  multivitamin 1 Tablet(s) Oral daily  nystatin Ointment 1 Application(s) Topical two times a day  pantoprazole    Tablet 40 milliGRAM(s) Oral before breakfast  QUEtiapine 12.5 milliGRAM(s) Oral at bedtime  senna 2 Tablet(s) Oral at bedtime    MEDICATIONS  (PRN):  acetaminophen     Tablet .. 650 milliGRAM(s) Oral every 6 hours PRN Mild Pain (1 - 3)  ALPRAZolam 0.25 milliGRAM(s) Oral daily PRN agitation  dextrose Oral Gel 15 Gram(s) Oral once PRN Blood Glucose LESS THAN 70 milliGRAM(s)/deciliter  magnesium hydroxide Suspension 30 milliLiter(s) Oral daily PRN Constipation    Allergies    No Known Allergies    Intolerances      REVIEW OF SYSTEMS:  All other systems reviewed and are negative    Vital Signs Last 24 Hrs  T(C): 37.4 (14 Jan 2023 11:00), Max: 37.9 (13 Jan 2023 16:36)  T(F): 99.4 (14 Jan 2023 11:00), Max: 100.2 (13 Jan 2023 16:36)  HR: 100 (14 Jan 2023 11:00) (100 - 105)  BP: 145/79 (14 Jan 2023 11:00) (99/63 - 146/78)  BP(mean): --  RR: 18 (14 Jan 2023 11:00) (17 - 18)  SpO2: 98% (14 Jan 2023 11:00) (96% - 98%)    Parameters below as of 13 Jan 2023 16:36  Patient On (Oxygen Delivery Method): room air      Daily     Daily   I&O's Summary    13 Jan 2023 07:01  -  14 Jan 2023 07:00  --------------------------------------------------------  IN: 300 mL / OUT: 250 mL / NET: 50 mL      CAPILLARY BLOOD GLUCOSE      POCT Blood Glucose.: 197 mg/dL (14 Jan 2023 07:45)  POCT Blood Glucose.: 256 mg/dL (13 Jan 2023 21:59)  POCT Blood Glucose.: 303 mg/dL (13 Jan 2023 16:08)    PHYSICAL EXAM:  GENERAL: NAD,    HEAD:  Atraumatic, Normocephalic  EYES: EOMI, PERRLA, conjunctiva and sclera clear  ENMT: No tonsillar erythema, exudates, or enlargement; Moist mucous membranes, Good dentition, No lesions  NECK: Supple, No JVD, Normal thyroid  NERVOUS SYSTEM:  Alert & Oriented X3, Good concentration; Motor Strength 5/5 B/L upper and lower extremities; DTRs 2+ intact and symmetric  CHEST/LUNG: Clear to percussion bilaterally; No rales, rhonchi, wheezing, or rubs  HEART: Regular rate and rhythm; No murmurs, rubs, or gallops  ABDOMEN: Soft, Nontender, Nondistended; Bowel sounds present  EXTREMITIES:  2+ Peripheral Pulses, No clubbing, cyanosis, or edema  LYMPH: No lymphadenopathy noted  SKIN: No rashes or lesions    Labs                                DVT prophylaxis: > Lovenox 40mg SQ daily  > Heparin   > SCD's

## 2023-01-15 LAB
FLUAV AG NPH QL: SIGNIFICANT CHANGE UP
FLUBV AG NPH QL: SIGNIFICANT CHANGE UP
GLUCOSE BLDC GLUCOMTR-MCNC: 243 MG/DL — HIGH (ref 70–99)
GLUCOSE BLDC GLUCOMTR-MCNC: 277 MG/DL — HIGH (ref 70–99)
GLUCOSE BLDC GLUCOMTR-MCNC: 283 MG/DL — HIGH (ref 70–99)
GLUCOSE BLDC GLUCOMTR-MCNC: 310 MG/DL — HIGH (ref 70–99)
SARS-COV-2 RNA SPEC QL NAA+PROBE: SIGNIFICANT CHANGE UP

## 2023-01-15 PROCEDURE — 99232 SBSQ HOSP IP/OBS MODERATE 35: CPT

## 2023-01-15 RX ADMIN — Medication 2: at 08:26

## 2023-01-15 RX ADMIN — PANTOPRAZOLE SODIUM 40 MILLIGRAM(S): 20 TABLET, DELAYED RELEASE ORAL at 08:27

## 2023-01-15 RX ADMIN — NYSTATIN CREAM 1 APPLICATION(S): 100000 CREAM TOPICAL at 05:33

## 2023-01-15 RX ADMIN — QUETIAPINE FUMARATE 12.5 MILLIGRAM(S): 200 TABLET, FILM COATED ORAL at 22:14

## 2023-01-15 RX ADMIN — ATORVASTATIN CALCIUM 40 MILLIGRAM(S): 80 TABLET, FILM COATED ORAL at 22:14

## 2023-01-15 RX ADMIN — APIXABAN 5 MILLIGRAM(S): 2.5 TABLET, FILM COATED ORAL at 17:54

## 2023-01-15 RX ADMIN — INSULIN GLARGINE 12 UNIT(S): 100 INJECTION, SOLUTION SUBCUTANEOUS at 22:15

## 2023-01-15 RX ADMIN — NYSTATIN CREAM 1 APPLICATION(S): 100000 CREAM TOPICAL at 17:54

## 2023-01-15 RX ADMIN — Medication 2000 UNIT(S): at 12:18

## 2023-01-15 RX ADMIN — Medication 4: at 12:17

## 2023-01-15 RX ADMIN — APIXABAN 5 MILLIGRAM(S): 2.5 TABLET, FILM COATED ORAL at 05:33

## 2023-01-15 RX ADMIN — Medication 81 MILLIGRAM(S): at 12:18

## 2023-01-15 RX ADMIN — Medication 3: at 16:44

## 2023-01-15 RX ADMIN — PREGABALIN 1000 MICROGRAM(S): 225 CAPSULE ORAL at 12:18

## 2023-01-15 RX ADMIN — Medication 2 UNIT(S): at 08:27

## 2023-01-15 RX ADMIN — MAGNESIUM HYDROXIDE 30 MILLILITER(S): 400 TABLET, CHEWABLE ORAL at 12:20

## 2023-01-15 RX ADMIN — Medication 2 UNIT(S): at 16:44

## 2023-01-15 RX ADMIN — Medication 2 UNIT(S): at 12:17

## 2023-01-15 RX ADMIN — Medication 1 TABLET(S): at 12:18

## 2023-01-15 RX ADMIN — Medication 1 MILLIGRAM(S): at 12:18

## 2023-01-15 NOTE — PROGRESS NOTE ADULT - ASSESSMENT
72 F with hx of HTN, non-IDMMT2, presents from home for inability to care for herself via Adult Protective Services.     1/7/23 elevated d-dimer,  LE duplex showed Left soleal vein deep vein thrombosis. Acute deep venous thrombosis: below the knee.   eliquis was started   1/8/23 tachycardia most likely d/t agitation and pain ; refusing meds and care at times, most likely some component of delirium and pain     possibly fungal infection /pressure injury of back d/t prolonged sitting in her feces/urine   --wound care appreciated   --nystatin ointment to affected areas (nystatin powder does not stick to affected wounds)  --if able , will attempt dermatology eval although difficult in this hospital as dermatology specialty is not available. This is not a derm urgency or emergency and will continue to monitor progress   --ID following   1/8/23 added ancef to cover for superimposed cellulitis of her back wounds x 5 days   discussed with RN, please leave affected area to air dry after nystatin ointment     completed antibiotics, c/w skincare ,    DVT   c/w eliquis     Self neglect.   ·  Plan: Found with feces, cockroaches, has been unable to get up from her couch x months.   Was trying to care for herself with help of neighbor.   - SW consult to f/up if open APS case  - Wound care consult for rashes + likely pressure ulcer on back  - Cousin Munira left 3rd number to contact in case of emergency,  [place] [phone] along with  or .   - [place] neighbor number [phone].    #H/O CVA (cerebrovascular accident) 2002 with left sided deficits   CT head: no acute findings   ECHO  continue with ASA, statin     history of Left hip replacement     L hip x-ray --without findings        AOCD  ·  Plan: Unclear baseline, may be 2/2 CKD  no e/o bleeding or bruising   H/H stable     Hypertension.  BP OK off meds      Diabetes mellitus.   A1c 7.6%   ISS, lantus   FS goal 140-180     Has elevated TSH to 4, check T4 however within goal for elderly.    hypercalcemia - resolved w hydration     Vit D deficiency --supplement   hypomagnesemia replace    DNR/DNI   fall, aspiration precautions, HOBE

## 2023-01-15 NOTE — PROGRESS NOTE ADULT - SUBJECTIVE AND OBJECTIVE BOX
Patient is a 72y old  Female who presents with a chief complaint of Failure to thrive (14 Jan 2023 11:26)    INTERVAL HPI/OVERNIGHT EVENTS:    MEDICATIONS  (STANDING):  apixaban 5 milliGRAM(s) Oral every 12 hours  aspirin  chewable 81 milliGRAM(s) Oral daily  atorvastatin 40 milliGRAM(s) Oral at bedtime  cholecalciferol 2000 Unit(s) Oral daily  cyanocobalamin 1000 MICROGram(s) Oral daily  dextrose 5%. 1000 milliLiter(s) (100 mL/Hr) IV Continuous <Continuous>  dextrose 5%. 1000 milliLiter(s) (50 mL/Hr) IV Continuous <Continuous>  dextrose 50% Injectable 25 Gram(s) IV Push once  dextrose 50% Injectable 12.5 Gram(s) IV Push once  dextrose 50% Injectable 25 Gram(s) IV Push once  folic acid 1 milliGRAM(s) Oral daily  glucagon  Injectable 1 milliGRAM(s) IntraMuscular once  insulin glargine Injectable (LANTUS) 12 Unit(s) SubCutaneous at bedtime  insulin lispro (ADMELOG) corrective regimen sliding scale   SubCutaneous three times a day before meals  insulin lispro Injectable (ADMELOG) 2 Unit(s) SubCutaneous three times a day before meals  lactated ringers. 1000 milliLiter(s) (75 mL/Hr) IV Continuous <Continuous>  multivitamin 1 Tablet(s) Oral daily  nystatin Ointment 1 Application(s) Topical two times a day  pantoprazole    Tablet 40 milliGRAM(s) Oral before breakfast  QUEtiapine 12.5 milliGRAM(s) Oral at bedtime  senna 2 Tablet(s) Oral at bedtime    MEDICATIONS  (PRN):  acetaminophen     Tablet .. 650 milliGRAM(s) Oral every 6 hours PRN Mild Pain (1 - 3)  ALPRAZolam 0.25 milliGRAM(s) Oral daily PRN agitation  dextrose Oral Gel 15 Gram(s) Oral once PRN Blood Glucose LESS THAN 70 milliGRAM(s)/deciliter  magnesium hydroxide Suspension 30 milliLiter(s) Oral daily PRN Constipation    Allergies    No Known Allergies    Intolerances      REVIEW OF SYSTEMS:  All other systems reviewed and are negative    Vital Signs Last 24 Hrs  T(C): 36.6 (15 Rajinder 2023 05:56), Max: 37.7 (14 Jan 2023 17:37)  T(F): 97.9 (15 Rajinder 2023 05:56), Max: 99.8 (14 Jan 2023 17:37)  HR: 92 (15 Rajinder 2023 05:56) (92 - 101)  BP: 118/65 (15 Rajinder 2023 05:56) (106/67 - 143/83)  BP(mean): --  RR: 18 (15 Rajinder 2023 05:56) (18 - 18)  SpO2: 98% (15 Rajinder 2023 05:56) (97% - 98%)      Daily     Daily   I&O's Summary    14 Jan 2023 07:01  -  15 Rajinder 2023 07:00  --------------------------------------------------------  IN: 50 mL / OUT: 250 mL / NET: -200 mL      CAPILLARY BLOOD GLUCOSE      POCT Blood Glucose.: 243 mg/dL (15 Rajinder 2023 07:34)  POCT Blood Glucose.: 295 mg/dL (14 Jan 2023 21:23)  POCT Blood Glucose.: 193 mg/dL (14 Jan 2023 16:23)  POCT Blood Glucose.: 221 mg/dL (14 Jan 2023 11:31)    PHYSICAL EXAM:  GENERAL: NAD,    HEAD:  Atraumatic, Normocephalic  EYES: EOMI, PERRLA, conjunctiva and sclera clear  ENMT: No tonsillar erythema, exudates, or enlargement; Moist mucous membranes, Good dentition, No lesions  NECK: Supple, No JVD, Normal thyroid  NERVOUS SYSTEM:  Alert & Oriented X3, Good concentration; Motor Strength 5/5 B/L upper and lower extremities; DTRs 2+ intact and symmetric  CHEST/LUNG: Clear to percussion bilaterally; No rales, rhonchi, wheezing, or rubs  HEART: Regular rate and rhythm; No murmurs, rubs, or gallops  ABDOMEN: Soft, Nontender, Nondistended; Bowel sounds present  EXTREMITIES:  2+ Peripheral Pulses, No clubbing, cyanosis, or edema  LYMPH: No lymphadenopathy noted  SKIN: No rashes or lesions    Labs                                DVT prophylaxis: > Lovenox 40mg SQ daily  > Heparin   > SCD's

## 2023-01-16 LAB
GLUCOSE BLDC GLUCOMTR-MCNC: 203 MG/DL — HIGH (ref 70–99)
GLUCOSE BLDC GLUCOMTR-MCNC: 236 MG/DL — HIGH (ref 70–99)
GLUCOSE BLDC GLUCOMTR-MCNC: 261 MG/DL — HIGH (ref 70–99)
GLUCOSE BLDC GLUCOMTR-MCNC: 311 MG/DL — HIGH (ref 70–99)

## 2023-01-16 PROCEDURE — 99232 SBSQ HOSP IP/OBS MODERATE 35: CPT

## 2023-01-16 RX ADMIN — INSULIN GLARGINE 12 UNIT(S): 100 INJECTION, SOLUTION SUBCUTANEOUS at 21:35

## 2023-01-16 RX ADMIN — APIXABAN 5 MILLIGRAM(S): 2.5 TABLET, FILM COATED ORAL at 05:17

## 2023-01-16 RX ADMIN — Medication 2 UNIT(S): at 11:56

## 2023-01-16 RX ADMIN — Medication 2: at 08:12

## 2023-01-16 RX ADMIN — Medication 2 UNIT(S): at 17:03

## 2023-01-16 RX ADMIN — NYSTATIN CREAM 1 APPLICATION(S): 100000 CREAM TOPICAL at 05:16

## 2023-01-16 RX ADMIN — Medication 4: at 17:03

## 2023-01-16 RX ADMIN — APIXABAN 5 MILLIGRAM(S): 2.5 TABLET, FILM COATED ORAL at 17:04

## 2023-01-16 RX ADMIN — Medication 81 MILLIGRAM(S): at 12:02

## 2023-01-16 RX ADMIN — Medication 1 MILLIGRAM(S): at 12:02

## 2023-01-16 RX ADMIN — SENNA PLUS 2 TABLET(S): 8.6 TABLET ORAL at 21:36

## 2023-01-16 RX ADMIN — Medication 2000 UNIT(S): at 12:02

## 2023-01-16 RX ADMIN — Medication 1 TABLET(S): at 12:01

## 2023-01-16 RX ADMIN — Medication 2 UNIT(S): at 08:12

## 2023-01-16 RX ADMIN — QUETIAPINE FUMARATE 12.5 MILLIGRAM(S): 200 TABLET, FILM COATED ORAL at 21:36

## 2023-01-16 RX ADMIN — ATORVASTATIN CALCIUM 40 MILLIGRAM(S): 80 TABLET, FILM COATED ORAL at 21:35

## 2023-01-16 RX ADMIN — PREGABALIN 1000 MICROGRAM(S): 225 CAPSULE ORAL at 12:02

## 2023-01-16 RX ADMIN — NYSTATIN CREAM 1 APPLICATION(S): 100000 CREAM TOPICAL at 17:04

## 2023-01-16 RX ADMIN — PANTOPRAZOLE SODIUM 40 MILLIGRAM(S): 20 TABLET, DELAYED RELEASE ORAL at 08:13

## 2023-01-16 RX ADMIN — Medication 3: at 11:55

## 2023-01-16 NOTE — PROGRESS NOTE ADULT - SUBJECTIVE AND OBJECTIVE BOX
Patient is a 72y old  Female who presents with a chief complaint of Failure to thrive (15 Rajinder 2023 11:15)    INTERVAL HPI/OVERNIGHT EVENTS: no events     MEDICATIONS  (STANDING):  apixaban 5 milliGRAM(s) Oral every 12 hours  aspirin  chewable 81 milliGRAM(s) Oral daily  atorvastatin 40 milliGRAM(s) Oral at bedtime  cholecalciferol 2000 Unit(s) Oral daily  cyanocobalamin 1000 MICROGram(s) Oral daily  dextrose 5%. 1000 milliLiter(s) (50 mL/Hr) IV Continuous <Continuous>  dextrose 5%. 1000 milliLiter(s) (100 mL/Hr) IV Continuous <Continuous>  dextrose 50% Injectable 25 Gram(s) IV Push once  dextrose 50% Injectable 12.5 Gram(s) IV Push once  dextrose 50% Injectable 25 Gram(s) IV Push once  folic acid 1 milliGRAM(s) Oral daily  glucagon  Injectable 1 milliGRAM(s) IntraMuscular once  insulin glargine Injectable (LANTUS) 12 Unit(s) SubCutaneous at bedtime  insulin lispro (ADMELOG) corrective regimen sliding scale   SubCutaneous three times a day before meals  insulin lispro Injectable (ADMELOG) 2 Unit(s) SubCutaneous three times a day before meals  multivitamin 1 Tablet(s) Oral daily  nystatin Ointment 1 Application(s) Topical two times a day  pantoprazole    Tablet 40 milliGRAM(s) Oral before breakfast  QUEtiapine 12.5 milliGRAM(s) Oral at bedtime  senna 2 Tablet(s) Oral at bedtime    MEDICATIONS  (PRN):  acetaminophen     Tablet .. 650 milliGRAM(s) Oral every 6 hours PRN Mild Pain (1 - 3)  dextrose Oral Gel 15 Gram(s) Oral once PRN Blood Glucose LESS THAN 70 milliGRAM(s)/deciliter  magnesium hydroxide Suspension 30 milliLiter(s) Oral daily PRN Constipation    Allergies    No Known Allergies    Intolerances      REVIEW OF SYSTEMS:  All other systems reviewed and are negative    Vital Signs Last 24 Hrs  T(C): 37.3 (16 Jan 2023 05:25), Max: 37.8 (15 Rajinder 2023 16:34)  T(F): 99.1 (16 Jan 2023 05:25), Max: 100.1 (15 Rajinder 2023 16:34)  HR: 96 (16 Jan 2023 05:25) (89 - 99)  BP: 114/64 (16 Jan 2023 05:25) (109/69 - 134/78)  BP(mean): --  RR: 16 (16 Jan 2023 05:25) (16 - 18)  SpO2: 95% (16 Jan 2023 05:25) (94% - 97%)    Parameters below as of 15 Rajinder 2023 16:34  Patient On (Oxygen Delivery Method): room air      Daily     Daily   I&O's Summary    15 Rajinder 2023 07:01  -  16 Jan 2023 07:00  --------------------------------------------------------  IN: 50 mL / OUT: 0 mL / NET: 50 mL      CAPILLARY BLOOD GLUCOSE      POCT Blood Glucose.: 203 mg/dL (16 Jan 2023 07:39)  POCT Blood Glucose.: 283 mg/dL (15 Rajinder 2023 21:49)  POCT Blood Glucose.: 277 mg/dL (15 Rajinder 2023 16:24)  POCT Blood Glucose.: 310 mg/dL (15 Rajinder 2023 12:05)    PHYSICAL EXAM:  GENERAL: NAD,    HEAD:  Atraumatic, Normocephalic  EYES: EOMI, PERRLA, conjunctiva and sclera clear  ENMT: No tonsillar erythema, exudates, or enlargement; Moist mucous membranes, Good dentition, No lesions  NECK: Supple, No JVD, Normal thyroid  NERVOUS SYSTEM:  Alert & Oriented X3, Good concentration; Motor Strength 5/5 B/L upper and lower extremities; DTRs 2+ intact and symmetric  CHEST/LUNG: Clear to percussion bilaterally; No rales, rhonchi, wheezing, or rubs  HEART: Regular rate and rhythm; No murmurs, rubs, or gallops  ABDOMEN: Soft, Nontender, Nondistended; Bowel sounds present  EXTREMITIES:  2+ Peripheral Pulses, No clubbing, cyanosis, or edema  LYMPH: No lymphadenopathy noted  SKIN: No rashes or lesions    Labs                                DVT prophylaxis: > Lovenox 40mg SQ daily  > Heparin   > SCD's

## 2023-01-16 NOTE — PROGRESS NOTE ADULT - ASSESSMENT
72 F with hx of HTN, non-IDMMT2, presents from home for inability to care for herself via Adult Protective Services.     1/7/23 elevated d-dimer,  LE duplex showed Left soleal vein deep vein thrombosis. Acute deep venous thrombosis: below the knee.   eliquis was started   1/8/23 tachycardia most likely d/t agitation and pain ; refusing meds and care at times, most likely some component of delirium and pain     possibly fungal infection /pressure injury of back d/t prolonged sitting in her feces/urine   --wound care appreciated   --nystatin ointment to affected areas (nystatin powder does not stick to affected wounds)  --if able , will attempt dermatology eval although difficult in this hospital as dermatology specialty is not available. This is not a derm urgency or emergency and will continue to monitor progress   --ID following   1/8/23 added ancef to cover for superimposed cellulitis of her back wounds x 5 days   discussed with RN, please leave affected area to air dry after nystatin ointment     completed antibiotics, c/w skincare ,    DVT   c/w eliquis     Self neglect.   ·  Plan: Found with feces, cockroaches, has been unable to get up from her couch x months.   Was trying to care for herself with help of neighbor.   - SW consult to f/up if open APS case  - Wound care consult for rashes + likely pressure ulcer on back  - Cousin Munira left 3rd number to contact in case of emergency,  Eleazar [phone] along with  or .   - Porter neighbor number [phone].    #H/O CVA (cerebrovascular accident) 2002 with left sided deficits   CT head: no acute findings   ECHO  continue with ASA, statin     history of Left hip replacement     L hip x-ray --without findings        AOCD  ·  Plan: Unclear baseline, may be 2/2 CKD  no e/o bleeding or bruising   H/H stable     Hypertension.  BP OK off meds      Diabetes mellitus.   A1c 7.6%   ISS, lantus   FS goal 140-180     Has elevated TSH to 4, check T4 however within goal for elderly.    hypercalcemia - resolved w hydration     Vit D deficiency --supplement   hypomagnesemia replace    DNR/DNI   fall, aspiration precautions, HOBE

## 2023-01-17 VITALS
TEMPERATURE: 98 F | SYSTOLIC BLOOD PRESSURE: 127 MMHG | RESPIRATION RATE: 17 BRPM | HEART RATE: 99 BPM | DIASTOLIC BLOOD PRESSURE: 79 MMHG | OXYGEN SATURATION: 95 %

## 2023-01-17 LAB
GLUCOSE BLDC GLUCOMTR-MCNC: 165 MG/DL — HIGH (ref 70–99)
GLUCOSE BLDC GLUCOMTR-MCNC: 191 MG/DL — HIGH (ref 70–99)
GLUCOSE BLDC GLUCOMTR-MCNC: 228 MG/DL — HIGH (ref 70–99)

## 2023-01-17 PROCEDURE — 99232 SBSQ HOSP IP/OBS MODERATE 35: CPT

## 2023-01-17 RX ADMIN — Medication 81 MILLIGRAM(S): at 11:52

## 2023-01-17 RX ADMIN — Medication 2000 UNIT(S): at 11:53

## 2023-01-17 RX ADMIN — Medication 1 TABLET(S): at 11:52

## 2023-01-17 RX ADMIN — MAGNESIUM HYDROXIDE 30 MILLILITER(S): 400 TABLET, CHEWABLE ORAL at 13:03

## 2023-01-17 RX ADMIN — Medication 1: at 08:22

## 2023-01-17 RX ADMIN — Medication 1 MILLIGRAM(S): at 11:52

## 2023-01-17 RX ADMIN — Medication 2 UNIT(S): at 11:52

## 2023-01-17 RX ADMIN — APIXABAN 5 MILLIGRAM(S): 2.5 TABLET, FILM COATED ORAL at 06:07

## 2023-01-17 RX ADMIN — NYSTATIN CREAM 1 APPLICATION(S): 100000 CREAM TOPICAL at 06:07

## 2023-01-17 RX ADMIN — PREGABALIN 1000 MICROGRAM(S): 225 CAPSULE ORAL at 11:52

## 2023-01-17 RX ADMIN — PANTOPRAZOLE SODIUM 40 MILLIGRAM(S): 20 TABLET, DELAYED RELEASE ORAL at 08:21

## 2023-01-17 RX ADMIN — Medication 2 UNIT(S): at 08:22

## 2023-01-17 RX ADMIN — Medication 1: at 11:51

## 2023-01-17 NOTE — PROGRESS NOTE ADULT - REASON FOR ADMISSION
Failure to thrive

## 2023-01-17 NOTE — PROGRESS NOTE ADULT - ASSESSMENT
72 F with hx of HTN, non-IDMMT2, presents from home for inability to care for herself via Adult Protective Services.     1/7/23 elevated d-dimer,  LE duplex showed Left soleal vein deep vein thrombosis. Acute deep venous thrombosis: below the knee.   eliquis was started   1/8/23 tachycardia most likely d/t agitation and pain ; refusing meds and care at times, most likely some component of delirium and pain     possibly fungal infection /pressure injury of back d/t prolonged sitting in her feces/urine   --wound care appreciated   --nystatin ointment to affected areas (nystatin powder does not stick to affected wounds)  --if able , will attempt dermatology eval although difficult in this hospital as dermatology specialty is not available. This is not a derm urgency or emergency and will continue to monitor progress   --ID following   1/8/23 added ancef to cover for superimposed cellulitis of her back wounds x 5 days   discussed with RN, please leave affected area to air dry after nystatin ointment     completed antibiotics, c/w skincare ,    DVT   c/w eliquis     Self neglect.   ·  Plan: Found with feces, cockroaches, has been unable to get up from her couch x months.   Was trying to care for herself with help of neighbor.   - SW consult to f/up if open APS case  - Wound care consult for rashes + likely pressure ulcer on back  - Cousin Munira left 3rd number to contact in case of emergency,  Eleazar [phone] along with  or .   - Porter neighbor number [phone].    #H/O CVA (cerebrovascular accident) 2002 with left sided deficits   CT head: no acute findings   ECHO  continue with ASA, statin     history of Left hip replacement     L hip x-ray --without findings        AOCD  ·  Plan: Unclear baseline, may be 2/2 CKD  no e/o bleeding or bruising   H/H stable     Hypertension.  BP OK off meds      Diabetes mellitus.   A1c 7.6%   ISS, lantus   FS goal 140-180     Has elevated TSH to 4, check T4 however within goal for elderly.    hypercalcemia - resolved w hydration     Vit D deficiency --supplement   hypomagnesemia replace    DNR/DNI   fall, aspiration precautions, HOBE

## 2023-01-17 NOTE — PROGRESS NOTE ADULT - PROVIDER SPECIALTY LIST ADULT
Infectious Disease
Hospitalist
Infectious Disease
Hospitalist

## 2023-01-17 NOTE — PROGRESS NOTE ADULT - SUBJECTIVE AND OBJECTIVE BOX
Patient is a 72y old  Female who presents with a chief complaint of Failure to thrive (16 Jan 2023 10:49)    INTERVAL HPI/OVERNIGHT EVENTS:    MEDICATIONS  (STANDING):  apixaban 5 milliGRAM(s) Oral every 12 hours  aspirin  chewable 81 milliGRAM(s) Oral daily  atorvastatin 40 milliGRAM(s) Oral at bedtime  cholecalciferol 2000 Unit(s) Oral daily  cyanocobalamin 1000 MICROGram(s) Oral daily  dextrose 5%. 1000 milliLiter(s) (100 mL/Hr) IV Continuous <Continuous>  dextrose 5%. 1000 milliLiter(s) (50 mL/Hr) IV Continuous <Continuous>  dextrose 50% Injectable 25 Gram(s) IV Push once  dextrose 50% Injectable 12.5 Gram(s) IV Push once  dextrose 50% Injectable 25 Gram(s) IV Push once  folic acid 1 milliGRAM(s) Oral daily  glucagon  Injectable 1 milliGRAM(s) IntraMuscular once  insulin glargine Injectable (LANTUS) 12 Unit(s) SubCutaneous at bedtime  insulin lispro (ADMELOG) corrective regimen sliding scale   SubCutaneous three times a day before meals  insulin lispro Injectable (ADMELOG) 2 Unit(s) SubCutaneous three times a day before meals  multivitamin 1 Tablet(s) Oral daily  nystatin Ointment 1 Application(s) Topical two times a day  pantoprazole    Tablet 40 milliGRAM(s) Oral before breakfast  QUEtiapine 12.5 milliGRAM(s) Oral at bedtime  senna 2 Tablet(s) Oral at bedtime    MEDICATIONS  (PRN):  acetaminophen     Tablet .. 650 milliGRAM(s) Oral every 6 hours PRN Mild Pain (1 - 3)  dextrose Oral Gel 15 Gram(s) Oral once PRN Blood Glucose LESS THAN 70 milliGRAM(s)/deciliter  magnesium hydroxide Suspension 30 milliLiter(s) Oral daily PRN Constipation    Allergies    No Known Allergies    Intolerances      REVIEW OF SYSTEMS:  All other systems reviewed and are negative    Vital Signs Last 24 Hrs  T(C): 37.2 (17 Jan 2023 05:04), Max: 37.4 (16 Jan 2023 23:38)  T(F): 98.9 (17 Jan 2023 05:04), Max: 99.4 (16 Jan 2023 23:38)  HR: 96 (17 Jan 2023 05:04) (88 - 99)  BP: 120/75 (17 Jan 2023 05:04) (110/71 - 123/73)  BP(mean): --  RR: 17 (17 Jan 2023 05:04) (16 - 17)  SpO2: 97% (17 Jan 2023 05:04) (92% - 99%)      Daily     Daily   I&O's Summary    16 Jan 2023 07:01  -  17 Jan 2023 07:00  --------------------------------------------------------  IN: 50 mL / OUT: 1200 mL / NET: -1150 mL      CAPILLARY BLOOD GLUCOSE      POCT Blood Glucose.: 165 mg/dL (17 Jan 2023 08:20)  POCT Blood Glucose.: 236 mg/dL (16 Jan 2023 21:32)  POCT Blood Glucose.: 311 mg/dL (16 Jan 2023 16:20)  POCT Blood Glucose.: 261 mg/dL (16 Jan 2023 11:35)    PHYSICAL EXAM:  GENERAL: NAD,    HEAD:  Atraumatic, Normocephalic  EYES: EOMI, PERRLA, conjunctiva and sclera clear  ENMT: No tonsillar erythema, exudates, or enlargement; Moist mucous membranes, Good dentition, No lesions  NECK: Supple, No JVD, Normal thyroid  NERVOUS SYSTEM:  Alert & Oriented X3, Good concentration; Motor Strength 5/5 B/L upper and lower extremities; DTRs 2+ intact and symmetric  CHEST/LUNG: Clear to percussion bilaterally; No rales, rhonchi, wheezing, or rubs  HEART: Regular rate and rhythm; No murmurs, rubs, or gallops  ABDOMEN: Soft, Nontender, Nondistended; Bowel sounds present  EXTREMITIES:  2+ Peripheral Pulses, No clubbing, cyanosis, or edema  LYMPH: No lymphadenopathy noted  SKIN: No rashes or lesions    Labs                                DVT prophylaxis: > Lovenox 40mg SQ daily  > Heparin   > SCD's

## 2023-01-17 NOTE — DISCHARGE NOTE NURSING/CASE MANAGEMENT/SOCIAL WORK - PATIENT PORTAL LINK FT
You can access the FollowMyHealth Patient Portal offered by [facility] by registering at the following website: [email or web address]. By joining [employer]’s FollowMyHealth portal, you will also be able to view your health information using other applications (apps) compatible with our system.

## 2023-01-17 NOTE — CHART NOTE - NSCHARTNOTEFT_GEN_A_CORE
Pt admitted as her neighbor, Porter, who was assisting her c food & ADLs called [facility]; they evaluated pt & sent her to hospital. Pt was unable to take care of herself; found in poor living conditions; Adult Protective Services involved; possible LTC placement.  PMHx includes h/o CVA, AOCD, HTN, HLD.  Pt DNR/I.      Factors impacting intake: [ ] none [ ] nausea  [ ] vomiting [ ] diarrhea [ ] constipation  [ ]chewing problems [ ] swallowing issues  [ ] other:     Diet Prescription: Diet, Pureed:   Consistent Carbohydrate {Evening Snack}  Low Sodium  Supplement Feeding Modality:  Oral  Ensure Clear Cans or Servings Per Day:  1       Frequency:  Two Times a day (01-08-23 @ 13:30)    Intake: Pt consuming 26-75% most meals; friend at bedside assists her c eating as she can't see well    Current Weight:   64.7 kg (1/5)  % Weight Change:  no wts to trend    No edema noted    Pertinent Medications: MEDICATIONS  (STANDING):  apixaban 5 milliGRAM(s) Oral every 12 hours  aspirin  chewable 81 milliGRAM(s) Oral daily  atorvastatin 40 milliGRAM(s) Oral at bedtime  cholecalciferol 2000 Unit(s) Oral daily  cyanocobalamin 1000 MICROGram(s) Oral daily  dextrose 5%. 1000 milliLiter(s) (100 mL/Hr) IV Continuous <Continuous>  dextrose 5%. 1000 milliLiter(s) (50 mL/Hr) IV Continuous <Continuous>  dextrose 50% Injectable 25 Gram(s) IV Push once  dextrose 50% Injectable 12.5 Gram(s) IV Push once  dextrose 50% Injectable 25 Gram(s) IV Push once  folic acid 1 milliGRAM(s) Oral daily  glucagon  Injectable 1 milliGRAM(s) IntraMuscular once  insulin glargine Injectable (LANTUS) 12 Unit(s) SubCutaneous at bedtime  insulin lispro (ADMELOG) corrective regimen sliding scale   SubCutaneous three times a day before meals  insulin lispro Injectable (ADMELOG) 2 Unit(s) SubCutaneous three times a day before meals  multivitamin 1 Tablet(s) Oral daily  nystatin Ointment 1 Application(s) Topical two times a day  pantoprazole    Tablet 40 milliGRAM(s) Oral before breakfast  QUEtiapine 12.5 milliGRAM(s) Oral at bedtime  senna 2 Tablet(s) Oral at bedtime    MEDICATIONS  (PRN):  acetaminophen     Tablet .. 650 milliGRAM(s) Oral every 6 hours PRN Mild Pain (1 - 3)  dextrose Oral Gel 15 Gram(s) Oral once PRN Blood Glucose LESS THAN 70 milliGRAM(s)/deciliter  magnesium hydroxide Suspension 30 milliLiter(s) Oral daily PRN Constipation    Pertinent Labs:  01-12 Phos 1.8 mg/dL<L> 01-12 Alb 1.5 g/dL<L> 01-05 Chol 148 mg/dL LDL --    HDL 43 mg/dL<L> Trig 141 mg/dL  01-06-23  A1C 7.6%; 01-16 POCT: 203, 261, 311, 236    Skin:  Stage I right heel, left lateral malleolus; stage II right midback     Estimated Needs:   [X ] no change since previous assessment (1/9)  [ ] recalculated:     Previous Nutrition Diagnosis:   [ ]  Severe Malnutrition in context of acute illness  Etiology:  Inadequate energy/protein related to AMS, social circumstances (difficulty c food procurement & prep)  Signs & Symptoms:  Physical findings of moderate/severe muscle wasting & fat depletion as noted 1/9    GOAL:  Pt to consume >50% meals/supplement - not consistently met    Nutrition Diagnosis is [X ] ongoing  [ ] resolved [ ] not applicable     New Nutrition Diagnosis: [x ] not applicable      Interventions:   Recommend  [X ] Change Diet To:  Pureed consistency; CCHO no snack, Low Na  [x ] Nutrition Supplement:  decrease to Ensure Clear x 1/day (provides 240 kcal, 8 g protein) as BG f/s are elevated  [ ] Nutrition Support  [ ] Other:     Monitoring and Evaluation:   [X ] PO intake [ x ] Tolerance to diet prescription [ x ] weights [ x ] labs[ x ] follow up per protocol  [ ] other:

## 2023-01-17 NOTE — PROGRESS NOTE ADULT - NUTRITIONAL ASSESSMENT
This patient has been assessed with a concern for Malnutrition and has been determined to have a diagnosis/diagnoses of Severe protein-calorie malnutrition.    This patient is being managed with:   Diet Pureed-  Consistent Carbohydrate {Evening Snack}  Low Sodium  Supplement Feeding Modality:  Oral  Ensure Clear Cans or Servings Per Day:  1       Frequency:  Two Times a day  Entered: Jan 8 2023  1:29PM    

## 2023-01-26 DIAGNOSIS — Z96.642 PRESENCE OF LEFT ARTIFICIAL HIP JOINT: ICD-10-CM

## 2023-01-26 DIAGNOSIS — I69.344 MONOPLEGIA OF LOWER LIMB FOLLOWING CEREBRAL INFARCTION AFFECTING LEFT NON-DOMINANT SIDE: ICD-10-CM

## 2023-01-26 DIAGNOSIS — R15.9 FULL INCONTINENCE OF FECES: ICD-10-CM

## 2023-01-26 DIAGNOSIS — L89.96 PRESSURE-INDUCED DEEP TISSUE DAMAGE OF UNSPECIFIED SITE: ICD-10-CM

## 2023-01-26 DIAGNOSIS — R62.7 ADULT FAILURE TO THRIVE: ICD-10-CM

## 2023-01-26 DIAGNOSIS — Z66 DO NOT RESUSCITATE: ICD-10-CM

## 2023-01-26 DIAGNOSIS — B36.9 SUPERFICIAL MYCOSIS, UNSPECIFIED: ICD-10-CM

## 2023-01-26 DIAGNOSIS — E43 UNSPECIFIED SEVERE PROTEIN-CALORIE MALNUTRITION: ICD-10-CM

## 2023-01-26 DIAGNOSIS — R46.89 OTHER SYMPTOMS AND SIGNS INVOLVING APPEARANCE AND BEHAVIOR: ICD-10-CM

## 2023-01-26 DIAGNOSIS — D63.8 ANEMIA IN OTHER CHRONIC DISEASES CLASSIFIED ELSEWHERE: ICD-10-CM

## 2023-01-26 DIAGNOSIS — I82.4Y2 ACUTE EMBOLISM AND THROMBOSIS OF UNSPECIFIED DEEP VEINS OF LEFT PROXIMAL LOWER EXTREMITY: ICD-10-CM

## 2023-01-26 DIAGNOSIS — Z74.01 BED CONFINEMENT STATUS: ICD-10-CM

## 2023-01-26 DIAGNOSIS — L03.312 CELLULITIS OF BACK [ANY PART EXCEPT BUTTOCK]: ICD-10-CM

## 2023-01-26 DIAGNOSIS — E83.42 HYPOMAGNESEMIA: ICD-10-CM

## 2023-01-26 DIAGNOSIS — E83.52 HYPERCALCEMIA: ICD-10-CM

## 2023-01-26 DIAGNOSIS — E11.22 TYPE 2 DIABETES MELLITUS WITH DIABETIC CHRONIC KIDNEY DISEASE: ICD-10-CM

## 2023-01-26 DIAGNOSIS — R32 UNSPECIFIED URINARY INCONTINENCE: ICD-10-CM

## 2023-01-26 DIAGNOSIS — I12.9 HYPERTENSIVE CHRONIC KIDNEY DISEASE WITH STAGE 1 THROUGH STAGE 4 CHRONIC KIDNEY DISEASE, OR UNSPECIFIED CHRONIC KIDNEY DISEASE: ICD-10-CM

## 2023-01-26 DIAGNOSIS — R64 CACHEXIA: ICD-10-CM

## 2023-01-26 DIAGNOSIS — N17.9 ACUTE KIDNEY FAILURE, UNSPECIFIED: ICD-10-CM

## 2023-01-26 DIAGNOSIS — N18.2 CHRONIC KIDNEY DISEASE, STAGE 2 (MILD): ICD-10-CM

## 2023-01-26 DIAGNOSIS — I69.392 FACIAL WEAKNESS FOLLOWING CEREBRAL INFARCTION: ICD-10-CM
